# Patient Record
Sex: FEMALE | Race: WHITE | NOT HISPANIC OR LATINO | Employment: OTHER | URBAN - METROPOLITAN AREA
[De-identification: names, ages, dates, MRNs, and addresses within clinical notes are randomized per-mention and may not be internally consistent; named-entity substitution may affect disease eponyms.]

---

## 2017-04-25 ENCOUNTER — TELEPHONE (OUTPATIENT)
Dept: NEUROSURGERY | Facility: CLINIC | Age: 72
End: 2017-04-25

## 2017-04-25 DIAGNOSIS — M43.10 ACQUIRED SPONDYLOLISTHESIS: Primary | ICD-10-CM

## 2017-04-25 NOTE — TELEPHONE ENCOUNTER
Provider:  Asa  Caller: self  Time of call:   218  Phone #:  [phone]  Surgery:  Lumbar fusion, L3-5  Surgery Date:  11/2/16  Last visit:   12/6/16  Next visit: DEB MONTEZ:         Reason for call:         I have pended XR L-spine AP&Lat (because she is S/P Lumbar fusion)    ----- Message from Nkechi Pastrana sent at 4/25/2017  2:18 PM EDT -----  Contact: [phone]  PATIENT CALLING FOR A FOLLOW UP WITH DR. OSPINA.  ACCORDING TO LAST DICTATION SHE NEEDS TO HAVE DIAGNOSTIC STUDIES, NOT SURE WHAT SHE NEEDS.      PATIENT IS IN EPIC.

## 2017-04-27 ENCOUNTER — HOSPITAL ENCOUNTER (OUTPATIENT)
Dept: GENERAL RADIOLOGY | Facility: HOSPITAL | Age: 72
Discharge: HOME | End: 2017-04-27
Admitting: PHYSICIAN ASSISTANT

## 2017-04-27 DIAGNOSIS — M43.10 ACQUIRED SPONDYLOLISTHESIS: ICD-10-CM

## 2017-04-27 PROCEDURE — 72100 X-RAY EXAM L-S SPINE 2/3 VWS: CPT

## 2017-05-02 ENCOUNTER — OFFICE VISIT (OUTPATIENT)
Dept: NEUROSURGERY | Facility: CLINIC | Age: 72
End: 2017-05-02

## 2017-05-02 VITALS
DIASTOLIC BLOOD PRESSURE: 62 MMHG | HEIGHT: 67 IN | SYSTOLIC BLOOD PRESSURE: 130 MMHG | TEMPERATURE: 96.9 F | WEIGHT: 167 LBS | BODY MASS INDEX: 26.21 KG/M2

## 2017-05-02 DIAGNOSIS — M51.36 DEGENERATIVE DISC DISEASE, LUMBAR: ICD-10-CM

## 2017-05-02 DIAGNOSIS — M43.16 SPONDYLOLISTHESIS OF LUMBAR REGION: Primary | ICD-10-CM

## 2017-05-02 DIAGNOSIS — M48.062 SPINAL STENOSIS OF LUMBAR REGION WITH NEUROGENIC CLAUDICATION: ICD-10-CM

## 2017-05-02 PROCEDURE — 99212 OFFICE O/P EST SF 10 MIN: CPT | Performed by: NEUROLOGICAL SURGERY

## 2017-06-28 ENCOUNTER — OFFICE VISIT (OUTPATIENT)
Dept: CARDIOLOGY | Facility: CLINIC | Age: 72
End: 2017-06-28

## 2017-06-28 VITALS
BODY MASS INDEX: 27.19 KG/M2 | DIASTOLIC BLOOD PRESSURE: 72 MMHG | HEIGHT: 66 IN | HEART RATE: 64 BPM | WEIGHT: 169.2 LBS | SYSTOLIC BLOOD PRESSURE: 130 MMHG

## 2017-06-28 DIAGNOSIS — I49.8 ATRIAL ARRHYTHMIA: Primary | ICD-10-CM

## 2017-06-28 DIAGNOSIS — I10 ESSENTIAL HYPERTENSION: ICD-10-CM

## 2017-06-28 PROCEDURE — 99213 OFFICE O/P EST LOW 20 MIN: CPT | Performed by: INTERNAL MEDICINE

## 2017-06-28 RX ORDER — SODIUM PHOSPHATE,MONO-DIBASIC 19G-7G/118
1 ENEMA (ML) RECTAL DAILY
COMMUNITY
End: 2018-07-16 | Stop reason: HOSPADM

## 2017-06-28 RX ORDER — GLUCOSAMINE/D3/BOSWELLIA SERRA 1500MG-400
10000 TABLET ORAL DAILY
COMMUNITY

## 2017-06-28 NOTE — PROGRESS NOTES
Tanesha Lua  1945  [phone]      06/28/2017    [facility] CARDIOLOGY     Sreedhar Whyte MD  [address] [place] [address] DR CONTRERAS [address] [place] [id number]    Chief Complaint   Patient presents with   • Palpitations     PROBLEM LIST:  1. Multiple atrial arrhythmias:  a. 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   b. Persistent atrial bigeminy with paroxysms of atrial fibrillation.   c. Echocardiogram, 09/12/2007: Normal LV size and function; no significant valvular insufficiency.  d. Stress Cardiolite, 09/23/2010 normal LVEF, 75%. No ischemia.   2. Sinus bradycardia:  a. Resolved off of medical therapy.   3. Remote cigarette use with discontinuation approximately 10 years ago.  4. Lower back surgery in 1976 with left hip discomfort and numbness in the left anterior tibial area.  5. Hypertension.  6. Presyncope:  a. Tilt table test, 07/10/2010: Positive for vasodepressor effect on sublingual nitroglycerin.  b. Intolerant to midodrine.   7. Hyperlipidemia.   8. Chest discomfort:  a. Stress Cardiolite on 01/03/2003 with no evidence of ischemia.                  9. Back surgery 2017      Allergies  Allergies   Allergen Reactions   • Nasal Decongestant [Phenylephrine Hcl]      A-Fib       Current Medications    Current Outpatient Prescriptions:   •  aspirin 81 MG EC tablet, Take 81 mg by mouth daily., Disp: , Rfl:   •  Biotin 1000 MCG tablet, Take 1,000 mcg by mouth Daily., Disp: , Rfl:   •  calcium carbonate (OS-ELOINA) 600 MG tablet, Take 600 mg by mouth daily., Disp: , Rfl:   •  Ezetimibe (ZETIA PO), Take 10 mg by mouth daily., Disp: , Rfl:   •  fluticasone (FLONASE) 50 MCG/ACT nasal spray, 2 sprays into each nostril As Needed for rhinitis or allergies., Disp: , Rfl:   •  glucosamine-chondroitin 500-400 MG capsule capsule, Take 1 capsule by mouth Daily., Disp: , Rfl:   •  lisinopril (PRINIVIL,ZESTRIL) 40 MG tablet,  "Take 40 mg by mouth daily., Disp: , Rfl:   •  loratadine (CLARITIN) 10 MG tablet, Take 10 mg by mouth daily., Disp: , Rfl:   •  melatonin 1 MG tablet, Take 3 mg by mouth Every Night., Disp: , Rfl:   •  metoprolol succinate XL (TOPROL-XL) 25 MG 24 hr tablet, Take 25 mg by mouth daily., Disp: , Rfl:   •  Multiple Vitamins-Minerals (MULTIVITAMIN ADULT PO), Take 1 tablet by mouth Daily., Disp: , Rfl:   •  Omega-3 Fatty Acids (FISH OIL) 1000 MG capsule capsule, Take 1,000 mg by mouth Daily With Breakfast., Disp: , Rfl:   •  Polyethyl Glycol-Propyl Glycol (SYSTANE OP), Apply 1 drop to eye As Needed (dry eye)., Disp: , Rfl:     History of Present Illness   HPI    Pt presents for follow up of atrial arrhythmias and HTN. Since we last saw the pt, pt denies any sustained AF/flutter episodes. Every now and then, she feels a flutter. She denies SOB, CP, LH, and dizziness. Denies any bleeding, or TIA/CVA symptoms. Overall feels well. She had back surgery last fall and is doing much better. BP is well controlled.     ROS:  General:  Denies fatigue, weight gain or loss  Cardiovascular:  Denies CP, PND, syncope, near syncope, edema or palpitations.  Pulmonary:  Denies MANZO, cough, or wheezing      Vitals:    06/28/17 1007   BP: 130/72   BP Location: Left arm   Patient Position: Sitting   Pulse: 64   Weight: 169 lb 3.2 oz (76.7 kg)   Height: 66\" (167.6 cm)     PE:  General: NAD  Neck: no JVD, no carotid bruits, no TM  Heart RRR, NL S1, S2, S4 present, no rubs, murmurs  Lungs: CTA, no wheezes, rhonchi, or rales  Abd: soft, non-tender, NL BS  Ext: No musculoskeletal deformities, no edema, cyanosis, or clubbing  Psych: normal mood and affect    Diagnostic Data:  Procedures     Labs reveiwed from PCP   BUN 13   Cr 0.77  TSH 0.78  Hgb 12.7  Hct 39.7    1. Atrial arrhythmia    2. Essential hypertension          Plan:  1) Atrial arrhythmias/AT/PACs-  no recurrence  Continue present medications.   2) Anticoagulation  Continue ASA  3) HTN- " controlled  Wt loss, exercise, salt reduction    F/up in 12 months    Scribed for Hector Mcclellan MD by Vilma Desai PA-C. 6/28/2017  10:51 AM    I, Hector Mcclellan MD, personally performed the services face to face as described in this documentation and as scribed by the above named individual in my presence, and it is both accurate and complete.  6/28/2017  10:56 AM

## 2018-07-16 ENCOUNTER — OFFICE VISIT (OUTPATIENT)
Dept: CARDIOLOGY | Facility: CLINIC | Age: 73
End: 2018-07-16

## 2018-07-16 VITALS
WEIGHT: 165 LBS | BODY MASS INDEX: 26.52 KG/M2 | HEIGHT: 66 IN | SYSTOLIC BLOOD PRESSURE: 150 MMHG | HEART RATE: 56 BPM | OXYGEN SATURATION: 98 % | DIASTOLIC BLOOD PRESSURE: 82 MMHG

## 2018-07-16 DIAGNOSIS — I49.8 ATRIAL ARRHYTHMIA: Primary | ICD-10-CM

## 2018-07-16 DIAGNOSIS — I10 ESSENTIAL HYPERTENSION: ICD-10-CM

## 2018-07-16 DIAGNOSIS — R55 PRE-SYNCOPE: ICD-10-CM

## 2018-07-16 PROCEDURE — 99213 OFFICE O/P EST LOW 20 MIN: CPT | Performed by: PHYSICIAN ASSISTANT

## 2018-07-16 NOTE — PROGRESS NOTES
"    Tanesha Lua  1945  PCP: Sreedhar Whyte MD    SUBJECTIVE:   Tanesha Lua is a 73 y.o. female seen for a follow up visit regarding the following: Palpitations, HTN, bradycardia, history of AVNRT and PAF    CC:Palpitations    PROBLEM LIST:  1. Multiple atrial arrhythmias:  a. 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   b. Persistent atrial bigeminy with paroxysms of atrial fibrillation.   c. Echocardiogram, 09/12/2007: Normal LV size and function; no significant valvular insufficiency.  d. Stress Cardiolite, 09/23/2010 normal LVEF, 75%. No ischemia.   2. Sinus bradycardia:  a. Resolved off of medical therapy.   3. Remote cigarette use with discontinuation approximately 10 years ago.  4. Lower back surgery in 1976 with left hip discomfort and numbness in the left anterior tibial area.  5. Hypertension.  6. Presyncope:  a. Tilt table test, 07/10/2010: Positive for vasodepressor effect on sublingual nitroglycerin.  b. Intolerant to midodrine.   7. Hyperlipidemia.   8. Chest discomfort:  a. Stress Cardiolite on 01/03/2003 with no evidence of ischemia.                  9. Back surgery 2017     HPI:   Today I saw Mrs. Lua for routine follow-up regarding history of palpitations, AVNRT, bradycardia, and hypertension.  She states that since last visit with our office she has not had any recurrent episodes of palpitations or events with low heart rates.  She denies any recurrent near-syncope or syncope events.  She denies any chest pain or shortness of breath suggesting angina.  She does report that occasionally she will check her blood pressure when she is \"feeling bad\" and her blood pressure will sometimes be 130to 140 systolic range.  She does not routinely check her blood pressure otherwise.  She does follow-up with primary care provider and has lab work on a routine basis with most recent lab work that we have last " year showing mild dyslipidemia  on Zetia therapy.  Overall she states active she exercises regularly she recently bought a RV and is looking forward to traveling across country this summer.        ROS:  Review of Symptoms:  General: no recent weight loss/gain, weakness or fatigue  Skin: no rashes, lumps, or other skin changes  HEENT: + dizziness, lightheadedness, no vision changes  Respiratory: no cough or hemoptysis  Cardiovascular: + palpitations, and tachycardia  Gastrointestinal: no black/tarry stools or diarrhea  Urinary: no change in frequency or urgency  Peripheral Vascular: no claudication or leg cramps  Musculoskeletal: no muscle or joint pain/stiffness  Psychiatric: no depression or excessive stress  Neurological: no sensory or motor loss, no syncope  Hematologic: no anemia, easy bruising or bleeding  Endocrine: no thyroid problems, nor heat or cold intolerance    Cardiac PMH: (Old records have been reviewed and summarized below)      Past Medical History, Past Surgical History, Family history, Social History, and Medications were all reviewed with the patient today and updated as necessary.         Allergies   Allergen Reactions   • Nasal Decongestant [Phenylephrine Hcl]      A-Fib     Patient Active Problem List   Diagnosis   • Atrial arrhythmia   • Hypertension   • Hyperlipemia   • Pre-syncope   • Degenerative disc disease, lumbar   • Spondylisthesis   • Lumbar spine instability   • Spinal stenosis of lumbar region   • Spinal stenosis of lumbar region with neurogenic claudication     Past Medical History:   Diagnosis Date   • A-fib (CMS/[facility])    • Asthma     no problems in 5 years    • Back pain    • Back problem    • Bronchitis    • Cancer (CMS/HCC)     skin   • Chest discomfort     Stress Cardiolite on 01/03/2003 with no evidence of ischemia.   • Dizziness    • Heart burn    • Hx of fibrocystic disease of breast    • Hyperlipidemia    • Hypertension    • Sinus bradycardia     Resolved off of  "medical therapy.   • Stress incontinence    • Vertigo      Past Surgical History:   Procedure Laterality Date   • BACK SURGERY  1976    Lower back surgery-with left hip discomfort and numbness in the left anterior tibial area.   • BREAST LUMPECTOMY      bilateral   • CARDIAC ABLATION  01/01/1999   • COLONOSCOPY      15 years ago   • EYE SURGERY      bilateral cataracts removed   • KNEE CARTILAGE SURGERY Left 01/01/2010   • LASIK      bilateral   • LUMBAR LAMINECTOMY WITH FUSION N/A 11/2/2016    Procedure: LUMBAR DISCECTOMY FUSION INSTRUMENTATION WITH STEALTH L3-5;  Surgeon: Rene Bray MD;  Location: [facility];  Service:    • SKIN CANCER EXCISION       Family History   Problem Relation Age of Onset   • Cancer Mother    • Heart attack Father    • Stroke Sister    • Heart attack Brother      Social History   Substance Use Topics   • Smoking status: Former Smoker     Packs/day: 2.50     Types: Electronic Cigarette, Cigarettes     Quit date: 1990   • Smokeless tobacco: Never Used   • Alcohol use Yes      Comment: Rarely         PHYSICAL EXAM:    /82 (BP Location: Left arm, Patient Position: Sitting)   Pulse 56   Ht 167.6 cm (66\")   Wt 74.8 kg (165 lb)   SpO2 98%   BMI 26.63 kg/m²        Wt Readings from Last 5 Encounters:   07/16/18 74.8 kg (165 lb)   06/28/17 76.7 kg (169 lb 3.2 oz)   05/02/17 75.8 kg (167 lb)   12/06/16 75.6 kg (166 lb 9.6 oz)   11/02/16 75.8 kg (167 lb)       BP Readings from Last 5 Encounters:   07/16/18 150/82   06/28/17 130/72   05/02/17 130/62   12/06/16 126/72   11/05/16 136/64       General appearance - Alert, well appearing, and in no distress   Mental status - Affect appropriate to mood.  Eyes - Sclerae anicteric,  ENMT - Hearing grossly normal bilaterally, Dental hygiene good.  Neck - Carotids upstroke normal bilaterally, no bruits, no JVD.  Resp - Clear to auscultation, no wheezes, rales or rhonchi, symmetric air entry.  Heart - Normal rate, regular rhythm, normal S1, S2, " no murmurs, rubs, clicks or gallops.  GI - Soft, nontender, nondistended, no masses or organomegaly.  Neurological - Grossly intact - normal speech, no focal findings  Musculoskeletal - No joint tenderness, deformity or swelling, no muscular tenderness noted.  Extremities - Peripheral pulses normal, no pedal edema, no clubbing or cyanosis.  Skin - Normal coloration and turgor.  Psych -  oriented to person, place, and time.    Medical problems and test results were reviewed with the patient today.     ASSESSMENT and PLAN    1) Atrial arrhythmias/AT/PACs- She denies any  recurrence.    Continue present medications. Including BB.   2) Anticoagulation  Continue ASA daily   3) HTN- Borderline controlled. Home BP monitoring.  If remains elevated consider Zestoretic 40/12.5. Follow up with PCP.   4)HLD- Intolerance to statins. On Zetia. , followed by PCP  Wt loss, exercise, salt reduction    Return in about 1 year (around 7/16/2019).    7/16/2018  11:49 AM    Will Elsa HIGUERA

## 2019-07-22 NOTE — PROGRESS NOTES
[facility]   OFFICE NOTE      Tanesha Lua  1945  PCP: Sreedhar Whyte MD    SUBJECTIVE:   Tanesha Lua is a 74 y.o. female seen for a follow up visit regarding the following:     CC: Afib    HPI:   74-year-old white female presents today for evaluation regarding atrial arrhythmias, bradycardia, hypertension, and previous ablation procedures.  She reports that a few months ago she was with her family physician she had to adjust her lisinopril dose as her blood pressure has been elevated.  However with increasing does she has some episodes of dizziness and fatigue therefore the dose had to be corrected.  In addition she has had an episode where she went in the heat outside and few seconds felt nauseous weak lightheaded and had some funny feeling where her she may pass out with some vision changes.  She went inside drink some water laid on the couch and this resolved.  She fainted thinks is related to her blood pressure being on the low side.  She denies any significant tachypalpitations, chest pain with exertion suggesting angina or heart failure symptoms.    Cardiac PMH: (Old records have been reviewed and summarized below)  1. Multiple atrial arrhythmias:  a. 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   b. Persistent atrial bigeminy with paroxysms of atrial fibrillation.   c. Echocardiogram, 09/12/2007: Normal LV size and function; no significant valvular insufficiency.  d. Stress Cardiolite, 09/23/2010 normal LVEF, 75%. No ischemia.   e. Episode of Afib 2015 in setting of Pseduophed  2. Sinus bradycardia:  a. Resolved off of medical therapy.   3. Remote cigarette use with discontinuation approximately 10 years ago.  4. Hypertension.  5. Presyncope:  a. Tilt table test, 07/10/2010: Positive for vasodepressor effect on sublingual nitroglycerin.  b. Intolerant to midodrine.    6. Hyperlipidemia.   7. Chest discomfort:  a. Stress Cardiolite on 01/03/2003 with no evidence of ischemia.                  Past Medical History, Past Surgical History, Family history, Social History, and Medications were all reviewed with the patient today and updated as necessary.       Current Outpatient Medications:   •  aspirin 81 MG EC tablet, Take 81 mg by mouth daily., Disp: , Rfl:   •  Biotin 1000 MCG tablet, Take 1,000 mcg by mouth Daily., Disp: , Rfl:   •  calcium carbonate (OS-ELOINA) 600 MG tablet, Take 600 mg by mouth daily., Disp: , Rfl:   •  Ezetimibe (ZETIA PO), Take 10 mg by mouth daily., Disp: , Rfl:   •  fluticasone (FLONASE) 50 MCG/ACT nasal spray, 2 sprays into each nostril As Needed for rhinitis or allergies., Disp: , Rfl:   •  lisinopril (PRINIVIL,ZESTRIL) 40 MG tablet, Take 30 mg by mouth Daily., Disp: , Rfl:   •  loratadine (CLARITIN) 10 MG tablet, Take 10 mg by mouth As Needed., Disp: , Rfl:   •  melatonin 1 MG tablet, Take 3 mg by mouth Every Night., Disp: , Rfl:   •  metoprolol succinate XL (TOPROL-XL) 25 MG 24 hr tablet, Take 25 mg by mouth daily., Disp: , Rfl:   •  Multiple Vitamins-Minerals (MULTIVITAMIN ADULT PO), Take 1 tablet by mouth Daily., Disp: , Rfl:   •  Multiple Vitamins-Minerals (PRESERVISION AREDS PO), Take  by mouth Daily., Disp: , Rfl:   •  Omega-3 Fatty Acids (FISH OIL) 1000 MG capsule capsule, Take 1,000 mg by mouth Daily With Breakfast., Disp: , Rfl:   •  Polyethyl Glycol-Propyl Glycol (SYSTANE OP), Apply 1 drop to eye As Needed (dry eye)., Disp: , Rfl:   •  vitamin B-12 (CYANOCOBALAMIN) 1000 MCG tablet, Take 1,000 mcg by mouth Daily., Disp: , Rfl:       Allergies   Allergen Reactions   • Nasal Decongestant [Phenylephrine Hcl]      A-Fib     Patient Active Problem List   Diagnosis   • Atrial arrhythmia   • Hypertension   • Hyperlipemia   • Pre-syncope   • Degenerative disc disease, lumbar   • Spondylisthesis   • Lumbar spine instability   • Spinal stenosis of lumbar  region   • Spinal stenosis of lumbar region with neurogenic claudication     Past Medical History:   Diagnosis Date   • A-fib (CMS/HCC)    • Asthma     no problems in 5 years    • Back pain    • Back problem    • Bronchitis    • Cancer (CMS/HCC)     skin   • Chest discomfort     Stress Cardiolite on 2003 with no evidence of ischemia.   • Dizziness    • Heart burn    • Hx of fibrocystic disease of breast    • Hyperlipidemia    • Hypertension    • Sinus bradycardia     Resolved off of medical therapy.   • Stress incontinence    • Vertigo      Past Surgical History:   Procedure Laterality Date   • BACK SURGERY      Lower back surgery-with left hip discomfort and numbness in the left anterior tibial area.   • BREAST LUMPECTOMY      bilateral   • CARDIAC ABLATION  1999   • COLONOSCOPY      15 years ago   • EYE SURGERY      bilateral cataracts removed   • KNEE CARTILAGE SURGERY Left 2010   • LASIK      bilateral   • LUMBAR LAMINECTOMY WITH FUSION N/A 2016    Procedure: LUMBAR DISCECTOMY FUSION INSTRUMENTATION WITH STEALTH L3-5;  Surgeon: Rene Bray MD;  Location: [facility];  Service:    • SKIN CANCER EXCISION       Family History   Problem Relation Age of Onset   • Cancer Mother    • Heart attack Father    • Stroke Sister    • Heart attack Brother      Social History     Tobacco Use   • Smoking status: Former Smoker     Packs/day: 2.50     Types: Electronic Cigarette, Cigarettes     Last attempt to quit: 1990     Years since quittin.5   • Smokeless tobacco: Never Used   Substance Use Topics   • Alcohol use: Yes     Comment: Rarely       ROS:  Review of Symptoms:  General: no recent weight loss/gain, weakness or fatigue  Skin: no rashes, lumps, or other skin changes  HEENT: + dizziness.  Hx of Vertigo and NCS  Respiratory: no cough or hemoptysis  Cardiovascular: +palpitations, rare  Gastrointestinal: no black/tarry stools or diarrhea  Urinary: no change in frequency or  "urgency  Peripheral Vascular: no claudication or leg cramps  Musculoskeletal: no muscle or joint pain/stiffness  Psychiatric: no depression or excessive stress  Neurological: no sensory or motor loss,   Hematologic: no anemia, easy bruising or bleeding  Endocrine: no thyroid problems, nor heat or cold intolerance    PHYSICAL EXAM:    /66 (BP Location: Left arm, Patient Position: Sitting)   Pulse 61   Ht 167.6 cm (66\")   Wt 75.8 kg (167 lb 3.2 oz)   SpO2 98%   BMI 26.99 kg/m²        Wt Readings from Last 5 Encounters:   07/23/19 75.8 kg (167 lb 3.2 oz)   07/16/18 74.8 kg (165 lb)   06/28/17 76.7 kg (169 lb 3.2 oz)   05/02/17 75.8 kg (167 lb)   12/06/16 75.6 kg (166 lb 9.6 oz)       BP Readings from Last 5 Encounters:   07/23/19 118/66   07/16/18 150/82   06/28/17 130/72   05/02/17 130/62   12/06/16 126/72       General appearance - Alert, well appearing, and in no distress   Mental status - Affect appropriate to mood.  Eyes - Sclerae anicteric,  ENMT - Hearing grossly normal bilaterally, Dental hygiene good.  Neck - Carotids upstroke normal bilaterally, no bruits, no JVD.  Resp - Clear to auscultation, no wheezes, rales or rhonchi, symmetric air entry.  Heart - Normal rate, regular rhythm, normal S1, S2, no murmurs, rubs, clicks or gallops.  GI - Soft, nontender, nondistended, no masses or organomegaly.  Neurological - Grossly intact - normal speech, no focal findings  Musculoskeletal - No joint tenderness, deformity or swelling, no muscular tenderness noted.  Extremities - Peripheral pulses normal, no pedal edema, no clubbing or cyanosis.  Skin - Normal coloration and turgor.  Psych -  oriented to person, place, and time.    Medical problems and test results were reviewed with the patient today.     No results found for this or any previous visit (from the past 672 hour(s)).      EKG: (EKG has been independently visualized by me and summarized below)    ECG 12 Lead  Date/Time: 7/23/2019 11:39 " AM  Performed by: Rene Hunter PA  Authorized by: Rene Hunter PA   Comparison: compared with previous ECG from 11/1/2016  Similar to previous ECG  Rhythm: sinus rhythm  Ectopy: atrial premature contractions  Rate: normal  Conduction: conduction normal  ST Segments: ST segments normal  QRS axis: normal  Other: no other findings            ASSESSMENT   1. AT/PAC's/SVT:  Controlled on BB. She denies any palpitations.   2. HTN: Controlled on recently increased ACE, BB  3. HLD: Zetia   4. Anticoagulation:  ASA daily.   5. NCS:  Remote + TTT. Recent symptoms with change in BP medications, VVS when outside in heat.     PLAN  · Continue current medical therapy including beta-blocker with close monitoring of blood pressure and increase fluid intake.  She has had a few episodes of sound like recurrent vasovagal events in the heat.   · Return for follow-up in 1 year or sooner as needed.    7/23/2019  11:36 AM    Will Elsa HIGUERA

## 2019-07-23 ENCOUNTER — OFFICE VISIT (OUTPATIENT)
Dept: CARDIOLOGY | Facility: CLINIC | Age: 74
End: 2019-07-23

## 2019-07-23 VITALS
BODY MASS INDEX: 26.87 KG/M2 | DIASTOLIC BLOOD PRESSURE: 66 MMHG | HEART RATE: 61 BPM | SYSTOLIC BLOOD PRESSURE: 118 MMHG | WEIGHT: 167.2 LBS | OXYGEN SATURATION: 98 % | HEIGHT: 66 IN

## 2019-07-23 DIAGNOSIS — I49.8 ATRIAL ARRHYTHMIA: Primary | ICD-10-CM

## 2019-07-23 DIAGNOSIS — I10 ESSENTIAL HYPERTENSION: ICD-10-CM

## 2019-07-23 PROCEDURE — 99213 OFFICE O/P EST LOW 20 MIN: CPT | Performed by: PHYSICIAN ASSISTANT

## 2019-07-23 PROCEDURE — 93000 ELECTROCARDIOGRAM COMPLETE: CPT | Performed by: PHYSICIAN ASSISTANT

## 2019-07-23 RX ORDER — CYANOCOBALAMIN (VITAMIN B-12) 5000 MCG
5000 TABLET,DISINTEGRATING ORAL DAILY
COMMUNITY

## 2020-09-21 ENCOUNTER — OFFICE VISIT (OUTPATIENT)
Dept: CARDIOLOGY | Facility: CLINIC | Age: 75
End: 2020-09-21

## 2020-09-21 VITALS
HEIGHT: 68 IN | WEIGHT: 174 LBS | BODY MASS INDEX: 26.37 KG/M2 | HEART RATE: 75 BPM | DIASTOLIC BLOOD PRESSURE: 70 MMHG | OXYGEN SATURATION: 98 % | TEMPERATURE: 96.6 F | SYSTOLIC BLOOD PRESSURE: 120 MMHG

## 2020-09-21 DIAGNOSIS — I10 ESSENTIAL HYPERTENSION: Primary | ICD-10-CM

## 2020-09-21 DIAGNOSIS — R55 PRE-SYNCOPE: ICD-10-CM

## 2020-09-21 PROCEDURE — 99213 OFFICE O/P EST LOW 20 MIN: CPT | Performed by: PHYSICIAN ASSISTANT

## 2020-09-21 RX ORDER — UBIDECARENONE 100 MG
100 CAPSULE ORAL DAILY
COMMUNITY
End: 2021-05-12

## 2020-09-21 NOTE — PROGRESS NOTES
[facility] at [facility]   OFFICE NOTE      Tanesha Lua  1945  PCP: Sreedhar Whyte MD    SUBJECTIVE:   Tanesha Lua is a 75 y.o. female seen for a follow up visit regarding the following:     CC:HTN    HPI:   75 year old white female presents for follow up regarding SVT, bradycardia and Labile HTN. She reports her BP as dropped occasionally after her am dose of Zestril. Some times as low as 90mmHg systolic. She recently cute her dose of Zestril done to  20 mg daily. She denies any dizziness, near syncope or syncope. She denies any rapid heart beats or palpitations. She denies any chest pain or sob with exetion.     Cardiac PMH: (Old records have been reviewed and summarized below)  1. Multiple atrial arrhythmias:  a. 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   b. Persistent atrial bigeminy with paroxysms of atrial fibrillation.   c. Echocardiogram, 09/12/2007: Normal LV size and function; no significant valvular insufficiency.  d. Stress Cardiolite, 09/23/2010 normal LVEF, 75%. No ischemia.   e. Episode of Afib 2015 in setting of Pseduophed  2. Sinus bradycardia:  a. Resolved off of medical therapy.   3. Remote cigarette use with discontinuation approximately 10 years ago.  4. Hypertension.  5. Presyncope:  a. Tilt table test, 07/10/2010: Positive for vasodepressor effect on sublingual nitroglycerin.  b. Intolerant to midodrine.   6. Hyperlipidemia.   7. Chest discomfort:  a. Stress Cardiolite on 01/03/2003 with no evidence of ischemia  b. Negative MPS 2010.     Past Medical History, Past Surgical History, Family history, Social History, and Medications were all reviewed with the patient today and updated as necessary.       Current Outpatient Medications:   •  aspirin 81 MG EC tablet, Take 81 mg by mouth daily., Disp: , Rfl:   •  Biotin 1000 MCG tablet, Take 1,000 mcg by mouth Daily., Disp: ,  Rfl:   •  calcium carbonate (OS-ELOINA) 600 MG tablet, Take 600 mg by mouth daily., Disp: , Rfl:   •  coenzyme Q10 100 MG capsule, Take 100 mg by mouth Daily., Disp: , Rfl:   •  Ezetimibe (ZETIA PO), Take 10 mg by mouth daily., Disp: , Rfl:   •  fluticasone (FLONASE) 50 MCG/ACT nasal spray, 2 sprays into each nostril As Needed for rhinitis or allergies., Disp: , Rfl:   •  lisinopril (PRINIVIL,ZESTRIL) 40 MG tablet, Take 20 mg by mouth Daily., Disp: , Rfl:   •  loratadine (CLARITIN) 10 MG tablet, Take 10 mg by mouth As Needed., Disp: , Rfl:   •  melatonin 1 MG tablet, Take 3 mg by mouth Every Night., Disp: , Rfl:   •  metoprolol succinate XL (TOPROL-XL) 25 MG 24 hr tablet, Take 25 mg by mouth daily., Disp: , Rfl:   •  Multiple Vitamins-Minerals (MULTIVITAMIN ADULT PO), Take 1 tablet by mouth Daily., Disp: , Rfl:   •  Multiple Vitamins-Minerals (PRESERVISION AREDS PO), Take  by mouth Daily., Disp: , Rfl:   •  Omega-3 Fatty Acids (FISH OIL) 1000 MG capsule capsule, Take 1,000 mg by mouth Daily With Breakfast., Disp: , Rfl:   •  Polyethyl Glycol-Propyl Glycol (SYSTANE OP), Apply 1 drop to eye As Needed (dry eye)., Disp: , Rfl:   •  vitamin B-12 (CYANOCOBALAMIN) 1000 MCG tablet, Take 1,000 mcg by mouth Daily., Disp: , Rfl:       Allergies   Allergen Reactions   • Nasal Decongestant [Phenylephrine Hcl]      A-Fib     Patient Active Problem List   Diagnosis   • Atrial arrhythmia   • Hypertension   • Hyperlipemia   • Pre-syncope   • Degenerative disc disease, lumbar   • Spondylisthesis   • Lumbar spine instability   • Spinal stenosis of lumbar region   • Spinal stenosis of lumbar region with neurogenic claudication     Past Medical History:   Diagnosis Date   • A-fib (CMS/[facility])    • Asthma     no problems in 5 years    • Back pain    • Back problem    • Bronchitis    • Cancer (CMS/HCC)     skin   • Chest discomfort     Stress Cardiolite on 01/03/2003 with no evidence of ischemia.   • Dizziness    • Heart burn    • Hx of  fibrocystic disease of breast    • Hyperlipidemia    • Hypertension    • Sinus bradycardia     Resolved off of medical therapy.   • Stress incontinence    • Vertigo      Past Surgical History:   Procedure Laterality Date   • BACK SURGERY      Lower back surgery-with left hip discomfort and numbness in the left anterior tibial area.   • BREAST LUMPECTOMY      bilateral   • CARDIAC ABLATION  1999   • COLONOSCOPY      15 years ago   • EYE SURGERY      bilateral cataracts removed   • KNEE CARTILAGE SURGERY Left 2010   • LASIK      bilateral   • LUMBAR LAMINECTOMY WITH FUSION N/A 2016    Procedure: LUMBAR DISCECTOMY FUSION INSTRUMENTATION WITH STEALTH L3-5;  Surgeon: Rene Bray MD;  Location: [facility];  Service:    • SKIN CANCER EXCISION       Family History   Problem Relation Age of Onset   • Cancer Mother    • Heart attack Father    • Stroke Sister    • Heart attack Brother      Social History     Tobacco Use   • Smoking status: Former Smoker     Packs/day: 2.50     Types: Electronic Cigarette, Cigarettes     Quit date:      Years since quittin.7   • Smokeless tobacco: Never Used   Substance Use Topics   • Alcohol use: Yes     Comment: Rarely       ROS:  Review of Symptoms:  General: no recent weight loss/gain, weakness or fatigue  Skin: no rashes, lumps, or other skin changes  HEENT: no dizziness, lightheadedness, or vision changes  Respiratory: no cough or hemoptysis  Cardiovascular: + occasional  palpitations, and tachycardia  Gastrointestinal: no black/tarry stools or diarrhea  Urinary: no change in frequency or urgency  Peripheral Vascular: no claudication or leg cramps  Musculoskeletal: no muscle or joint pain/stiffness  Psychiatric: no depression or excessive stress  Neurological: no sensory or motor loss, no syncope  Hematologic: no anemia, easy bruising or bleeding  Endocrine: no thyroid problems, nor heat or cold intolerance    PHYSICAL EXAM:    /70 (BP Location:  "Right arm, Patient Position: Sitting)   Pulse 75   Temp 96.6 °F (35.9 °C)   Ht 172.7 cm (68\")   Wt 78.9 kg (174 lb)   SpO2 98%   BMI 26.46 kg/m²        Wt Readings from Last 5 Encounters:   09/21/20 78.9 kg (174 lb)   07/23/19 75.8 kg (167 lb 3.2 oz)   07/16/18 74.8 kg (165 lb)   06/28/17 76.7 kg (169 lb 3.2 oz)   05/02/17 75.8 kg (167 lb)       BP Readings from Last 5 Encounters:   09/21/20 120/70   07/23/19 118/66   07/16/18 150/82   06/28/17 130/72   05/02/17 130/62       General appearance - Alert, well appearing, and in no distress   Mental status - Affect appropriate to mood.  Eyes - Sclerae anicteric,  ENMT - Hearing grossly normal bilaterally, Dental hygiene good.  Neck - Carotids upstroke normal bilaterally, no bruits, no JVD.  Resp - Clear to auscultation, no wheezes, rales or rhonchi, symmetric air entry.  Heart - Normal rate, regular rhythm, normal S1, S2, no murmurs, rubs, clicks or gallops.  GI - Soft, nontender, nondistended, no masses or organomegaly.  Neurological - Grossly intact - normal speech, no focal findings  Musculoskeletal - No joint tenderness, deformity or swelling, no muscular tenderness noted.  Extremities - Peripheral pulses normal, no pedal edema, no clubbing or cyanosis.  Skin - Normal coloration and turgor.  Psych -  oriented to person, place, and time.    Medical problems and test results were reviewed with the patient today.     No results found for this or any previous visit (from the past 672 hour(s)).          ASSESSMENT   1. AT/PAC's/SVT: She denies any tachy palpitations.   2. HTN: Labile. Low BP in am after Zestril dose.   3. HLD: Zetia  4. NCS in setting of Heat outside    PLAN  · Change Zestril dose to 10mg BId. Hold evening dose if BP <110.   · Increase fluids, hydration.  · Follow up as scheduled 5/12/2021 9/21/2020  14:10 EDT    Will Elsa PA-C  "

## 2021-05-12 ENCOUNTER — OFFICE VISIT (OUTPATIENT)
Dept: CARDIOLOGY | Facility: CLINIC | Age: 76
End: 2021-05-12

## 2021-05-12 VITALS
WEIGHT: 164 LBS | SYSTOLIC BLOOD PRESSURE: 142 MMHG | OXYGEN SATURATION: 94 % | HEIGHT: 68 IN | BODY MASS INDEX: 24.86 KG/M2 | DIASTOLIC BLOOD PRESSURE: 84 MMHG | HEART RATE: 67 BPM

## 2021-05-12 DIAGNOSIS — I49.8 ATRIAL ARRHYTHMIA: Primary | ICD-10-CM

## 2021-05-12 DIAGNOSIS — I10 ESSENTIAL HYPERTENSION: ICD-10-CM

## 2021-05-12 PROCEDURE — 99214 OFFICE O/P EST MOD 30 MIN: CPT | Performed by: INTERNAL MEDICINE

## 2021-05-12 PROCEDURE — 93000 ELECTROCARDIOGRAM COMPLETE: CPT | Performed by: INTERNAL MEDICINE

## 2021-05-12 RX ORDER — MULTIVIT WITH MINERALS/LUTEIN
1000 TABLET ORAL DAILY
COMMUNITY
End: 2022-05-25

## 2021-05-12 RX ORDER — FAMOTIDINE 20 MG/1
20 TABLET, FILM COATED ORAL DAILY
COMMUNITY

## 2021-05-12 RX ORDER — LISINOPRIL AND HYDROCHLOROTHIAZIDE 20; 12.5 MG/1; MG/1
0.5 TABLET ORAL 2 TIMES DAILY
COMMUNITY
End: 2022-12-19 | Stop reason: ALTCHOICE

## 2021-05-12 NOTE — PROGRESS NOTES
Tanesha Lua  1945  [phone]    05/12/2021    [facility] CARDIOLOGY     Referring Provider: No ref. provider found     Isabell, Sreedhar Wolfe MD  [address] [place] [address] DR CONTRERAS [address] [place] [id number]    Chief Complaint   Patient presents with   • Essential hypertension       Problem List:     1. Multiple atrial arrhythmias:  a. 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   b. Persistent atrial bigeminy with paroxysms of atrial fibrillation.   c. Echocardiogram, 09/12/2007: Normal LV size and function; no significant valvular insufficiency.  d. Stress Cardiolite, 09/23/2010 normal LVEF, 75%. No ischemia.   e. Episode of Afib 2015 in setting of Pseduophed  2. Sinus bradycardia:  a. Resolved off of medical therapy.   3. Remote cigarette use with discontinuation approximately 10 years ago.  4. Hypertension.  5. Presyncope:  a. Tilt table test, 07/10/2010: Positive for vasodepressor effect on sublingual nitroglycerin.  b. Intolerant to midodrine.   6. Hyperlipidemia.   7. Chest discomfort:  a. Stress Cardiolite on 01/03/2003 with no evidence of ischemia  b. Negative MPS 2010.     Allergies  Allergies   Allergen Reactions   • Nasal Decongestant [Phenylephrine Hcl]      A-Fib       Current Medications    Current Outpatient Medications:   •  ascorbic acid (VITAMIN C) 1000 MG tablet, Take 1,000 mg by mouth Daily., Disp: , Rfl:   •  aspirin 81 MG EC tablet, Take 81 mg by mouth daily., Disp: , Rfl:   •  Biotin 60946 MCG tablet, Take 10,000 mcg by mouth Daily., Disp: , Rfl:   •  calcium carbonate (OS-ELOINA) 600 MG tablet, Take 600 mg by mouth daily., Disp: , Rfl:   •  Cyanocobalamin (Vitamin B-12) 5000 MCG tablet dispersible, Take 5,000 mcg by mouth Daily., Disp: , Rfl:   •  Ezetimibe (ZETIA PO), Take 10 mg by mouth daily., Disp: , Rfl:   •  famotidine (PEPCID) 20 MG tablet, Take 20 mg by mouth Daily., Disp: , Rfl:   •  fluticasone  "(FLONASE) 50 MCG/ACT nasal spray, 2 sprays into each nostril As Needed for rhinitis or allergies., Disp: , Rfl:   •  lisinopril-hydrochlorothiazide (PRINZIDE,ZESTORETIC) 20-12.5 MG per tablet, Take 0.5 tablets by mouth 2 (Two) Times a Day., Disp: , Rfl:   •  loratadine (CLARITIN) 10 MG tablet, Take 10 mg by mouth As Needed., Disp: , Rfl:   •  melatonin 5 MG tablet tablet, Take 5 mg by mouth Every Night., Disp: , Rfl:   •  metoprolol succinate XL (TOPROL-XL) 25 MG 24 hr tablet, Take 25 mg by mouth daily., Disp: , Rfl:   •  Multiple Vitamins-Minerals (MULTIVITAMIN ADULT PO), Take 1 tablet by mouth Daily., Disp: , Rfl:   •  Multiple Vitamins-Minerals (PRESERVISION AREDS PO), Take  by mouth Daily., Disp: , Rfl:   •  Omega-3 Fatty Acids (FISH OIL) 1000 MG capsule capsule, Take 1,000 mg by mouth Daily With Breakfast., Disp: , Rfl:   •  Polyethyl Glycol-Propyl Glycol (SYSTANE OP), Apply 1 drop to eye As Needed (dry eye)., Disp: , Rfl:     History of Present Illness     Pt presents for follow up of SVT, bradycardia, and labile HTN. Since we last saw the pt, pt denies any SVT episodes or palps. She does not to some intermittent chest heaviness and SOB, but this is not new and occurs at rest. Denies any hospitalizations, ER visits, bleeding issues on ASA, or TIA/CVA symptoms. Patient has continued to have some labile HTN at times. She tells me a couple times a month she will feel dizzy and \"lifeless\" and will check her bp and it will be 80s/50s. She will feel better after she lays down and rests. Otherwise her bps are 110-120/60-70s. Overall feels well.    ROS:  General:  + fatigue, No weight gain or loss  Cardiovascular:  Denies CP, PND, syncope, near syncope, edema or palpitations.  Pulmonary:  Denies MANZO, cough, or wheezing      Vitals:    05/12/21 1430   BP: 142/84   BP Location: Left arm   Patient Position: Sitting   Cuff Size: Adult   Pulse: 67   SpO2: 94%   Weight: 74.4 kg (164 lb)   Height: 172.7 cm (68\")     Body " mass index is 24.94 kg/m².  PE:  General: NAD  Neck: no JVD, no carotid bruits, no TM  Heart RRR, NL S1, S2, no rubs, murmurs  Lungs: CTA, no wheezes, rhonchi, or rales  Abd: soft, non-tender, NL BS  Ext: No musculoskeletal deformities, no edema, cyanosis, or clubbing  Psych: normal mood and affect    Diagnostic Data:        ECG 12 Lead    Date/Time: 5/12/2021 3:23 PM  Performed by: Hector Mcclellan MD  Authorized by: Hector Mcclellan MD   Comparison: compared with previous ECG from 7/23/2019  Similar to previous ECG  Rhythm: sinus rhythm  BPM: 60              1. Atrial arrhythmia    2. Essential hypertension          Plan:  1) Atrial arrhythmias/AT/PACs: no clinical recurrence  -  no recurrence  - Continue present medications.    2) Anticoagulation  -Continue ASA    3) HTN  -continues to be labile on Lisinopril-HCTC 20mg-12.5 mg 1/2 tab po bid, advised patient to to start taking just once a day 1/2 tab in AM and Toprol XL at nite. Keep log of bps..   Wt loss, exercise, salt reduction, increase fluids        Scribed for Hector Mcclellan MD by TIA Donnelly. 5/12/2021 15:23 EDT     I, Hector Mcclellan MD, personally performed the services described in this documentation as scribed by the above named individual in my presence, and it is both accurate and complete.  5/12/2021  15:24 EDT

## 2021-09-13 ENCOUNTER — TELEPHONE (OUTPATIENT)
Dept: CARDIOLOGY | Facility: CLINIC | Age: 76
End: 2021-09-13

## 2021-09-13 NOTE — TELEPHONE ENCOUNTER
Pt has had 3 episodes of afib in the last 2 weeks 2 of which she went to the ER. I transferred her to [facility] to schedule an appt with Rene Hunter PA-C.

## 2021-09-14 ENCOUNTER — OFFICE VISIT (OUTPATIENT)
Dept: CARDIOLOGY | Facility: CLINIC | Age: 76
End: 2021-09-14

## 2021-09-14 VITALS
WEIGHT: 160 LBS | SYSTOLIC BLOOD PRESSURE: 166 MMHG | BODY MASS INDEX: 24.25 KG/M2 | OXYGEN SATURATION: 95 % | DIASTOLIC BLOOD PRESSURE: 84 MMHG | HEART RATE: 65 BPM | HEIGHT: 68 IN

## 2021-09-14 DIAGNOSIS — I48.0 PAROXYSMAL ATRIAL FIBRILLATION (HCC): Primary | ICD-10-CM

## 2021-09-14 PROCEDURE — 93000 ELECTROCARDIOGRAM COMPLETE: CPT | Performed by: PHYSICIAN ASSISTANT

## 2021-09-14 PROCEDURE — 99214 OFFICE O/P EST MOD 30 MIN: CPT | Performed by: PHYSICIAN ASSISTANT

## 2021-09-14 RX ORDER — BRIMONIDINE TARTRATE/TIMOLOL 0.2%-0.5%
1 DROPS OPHTHALMIC (EYE) 2 TIMES DAILY
COMMUNITY
Start: 2021-07-19 | End: 2022-12-19

## 2021-09-14 NOTE — PROGRESS NOTES
Tanesha Lua  1945  [phone]    05/12/2021    [facility] CARDIOLOGY   Isabell, Sreedhar Wolfe MD  [address] [place] [address] DR CONTRERAS [address] [place] [id number]    Chief Complaint   Patient presents with   • Atrial arrhythmia       Problem List:     1. Multiple atrial arrhythmias:  a. 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   b. Persistent atrial bigeminy with paroxysms of atrial fibrillation.   c. Echocardiogram, 09/12/2007: Normal LV size and function; no significant valvular insufficiency.  d. Stress Cardiolite, 09/23/2010 normal LVEF, 75%. No ischemia.   e. Episode of Afib 2015 in setting of Pseduophed  f. Two recent ER visits to ED from [facility] in the past 2 weeks with A. fib with RVR per patient's report.  She was initiated on Xarelto therapy.  2. Sinus bradycardia:  a. Resolved off of medical therapy.   3. Remote cigarette use with discontinuation approximately 10 years ago.  4. Hypertension.  5. Presyncope:  a. Tilt table test, 07/10/2010: Positive for vasodepressor effect on sublingual nitroglycerin.  b. Intolerant to midodrine.   6. Hyperlipidemia.   7. Chest discomfort:  a. Stress Cardiolite on 01/03/2003 with no evidence of ischemia  b. Negative MPS 2010.     Allergies  Allergies   Allergen Reactions   • Nasal Decongestant [Phenylephrine Hcl]      A-Fib       Current Medications    Current Outpatient Medications:   •  ascorbic acid (VITAMIN C) 1000 MG tablet, Take 1,000 mg by mouth Daily., Disp: , Rfl:   •  aspirin 81 MG EC tablet, Take 81 mg by mouth daily., Disp: , Rfl:   •  Biotin 17243 MCG tablet, Take 10,000 mcg by mouth Daily., Disp: , Rfl:   •  calcium carbonate (OS-ELOINA) 600 MG tablet, Take 600 mg by mouth daily., Disp: , Rfl:   •  Cyanocobalamin (Vitamin B-12) 5000 MCG tablet dispersible, Take 5,000 mcg by mouth Daily., Disp: , Rfl:   •  Ezetimibe (ZETIA PO), Take 10 mg by mouth daily., Disp:  ", Rfl:   •  famotidine (PEPCID) 20 MG tablet, Take 20 mg by mouth Daily., Disp: , Rfl:   •  fluticasone (FLONASE) 50 MCG/ACT nasal spray, 2 sprays into each nostril As Needed for rhinitis or allergies., Disp: , Rfl:   •  lisinopril-hydrochlorothiazide (PRINZIDE,ZESTORETIC) 20-12.5 MG per tablet, Take 0.5 tablets by mouth 2 (Two) Times a Day., Disp: , Rfl:   •  loratadine (CLARITIN) 10 MG tablet, Take 10 mg by mouth As Needed., Disp: , Rfl:   •  melatonin 5 MG tablet tablet, Take 5 mg by mouth Every Night., Disp: , Rfl:   •  metoprolol succinate XL (TOPROL-XL) 25 MG 24 hr tablet, Take 25 mg by mouth daily., Disp: , Rfl:   •  Multiple Vitamins-Minerals (MULTIVITAMIN ADULT PO), Take 1 tablet by mouth Daily., Disp: , Rfl:   •  Multiple Vitamins-Minerals (PRESERVISION AREDS PO), Take  by mouth Daily., Disp: , Rfl:   •  Omega-3 Fatty Acids (FISH OIL) 1000 MG capsule capsule, Take 1,000 mg by mouth Daily With Breakfast., Disp: , Rfl:   •  Polyethyl Glycol-Propyl Glycol (SYSTANE OP), Apply 1 drop to eye As Needed (dry eye)., Disp: , Rfl:   •  Combigan 0.2-0.5 % ophthalmic solution, Administer 1 drop to both eyes 2 (two) times a day., Disp: , Rfl:     History of Present Illness   Pleasant 76-year-old female returns today for follow-up after recent recurrent breakthrough episodes of palpitations admission to ED from [facility] on 2 separate occasions.  Patient states these events occurred in the past month she has had 3 events to she led to her go to the ER.  She states in the ER she was told she had \"A. fib\".  We do not have records of these visits.  Apparently while there she was giving IV medications occluding IV metoprolol and other medications she eventually converted to sinus rhythm with only medications.  She is noted some edema in her lower extremities but otherwise she states she has not had any chest pain or chest tightness orthopnea other health changes since last seen with our office.  She was " "instructed to take Xarelto therapy after her last ER visit.  She has not started the medication yet.    ROS:  General:  + fatigue, No weight gain or loss  Cardiovascular:  Denies CP, PND, syncope, near syncope, edema or + palpitations  Pulmonary:  Denies MANZO, cough, or wheezing      Vitals:    09/14/21 1406   BP: 166/84   BP Location: Right arm   Patient Position: Sitting   Cuff Size: Adult   Pulse: 65   SpO2: 95%   Weight: 72.6 kg (160 lb)   Height: 172.7 cm (68\")     Body mass index is 24.33 kg/m².  PE:  General: NAD  Neck: no JVD, no carotid bruits, no TM  Heart RRR, NL S1, S2, no rubs, murmurs  Lungs: CTA, no wheezes, rhonchi, or rales  Abd: soft, non-tender, NL BS  Ext: No musculoskeletal deformities, no edema, cyanosis, or clubbing  Psych: normal mood and affect    Diagnostic Data:        ECG 12 Lead    Date/Time: 9/14/2021 2:57 PM  Performed by: Rene Hunter PA  Authorized by: Rene Hunter PA   Rhythm: sinus rhythm  Rate: normal  Conduction: conduction normal  ST Segments: ST segments normal  QRS axis: normal  Other: no other findings    Clinical impression: normal ECG            1. Paroxysmal atrial fibrillation (CMS/HCC)          Plan:  1) Atrial arrhythmias/AT/PACs/A. fib: Two separate ER visits to [facility] and another third breakthrough event.  She reports that this was atrial fibrillation/ flutter and spontaneusly converted to sinus rhythm after IV medications.  We will pursue an Echocardiogram tomorrow to rule out any structural heart disease. She has no angina symptoms . Will review EKg's  from [facility] .  We discussed risk and benefits of starting an AAD. She is agreeble to start Flecainide 50mg BID with follow up EKG 48 hours later if her Echo tomorrow is stable.     ) Anticoagulation: Initiate Xarelto 20 mg daily for chads Vasc equal to 3  Discontinue aspirin    3) HTN: Okay control on beta-blocker, Zestoretic  4)Remote negative MPS, and remote Normal " Echocardiogram .     Return for follow-up in 6 weeks or sooner as needed    Electronically signed by HAILEY Zuniga, 09/14/21, 3:01 PM EDT.

## 2021-09-15 ENCOUNTER — HOSPITAL ENCOUNTER (OUTPATIENT)
Dept: CARDIOLOGY | Facility: HOSPITAL | Age: 76
Discharge: HOME | End: 2021-09-15
Admitting: PHYSICIAN ASSISTANT

## 2021-09-15 VITALS — BODY MASS INDEX: 24.25 KG/M2 | HEIGHT: 68 IN | WEIGHT: 160 LBS

## 2021-09-15 DIAGNOSIS — I48.0 PAROXYSMAL ATRIAL FIBRILLATION (HCC): ICD-10-CM

## 2021-09-15 LAB
BH CV ECHO MEAS - AO MAX PG (FULL): 6.9 MMHG
BH CV ECHO MEAS - AO MAX PG: 11 MMHG
BH CV ECHO MEAS - AO MEAN PG (FULL): 4 MMHG
BH CV ECHO MEAS - AO MEAN PG: 6 MMHG
BH CV ECHO MEAS - AO ROOT AREA (BSA CORRECTED): 1.4
BH CV ECHO MEAS - AO ROOT AREA: 5.3 CM^2
BH CV ECHO MEAS - AO ROOT DIAM: 2.6 CM
BH CV ECHO MEAS - AO V2 MAX: 169 CM/SEC
BH CV ECHO MEAS - AO V2 MEAN: 116 CM/SEC
BH CV ECHO MEAS - AO V2 VTI: 38.8 CM
BH CV ECHO MEAS - AVA(I,A): 1.9 CM^2
BH CV ECHO MEAS - AVA(I,D): 1.9 CM^2
BH CV ECHO MEAS - AVA(V,A): 1.7 CM^2
BH CV ECHO MEAS - AVA(V,D): 1.7 CM^2
BH CV ECHO MEAS - BSA(HAYCOCK): 1.9 M^2
BH CV ECHO MEAS - BSA: 1.9 M^2
BH CV ECHO MEAS - BZI_BMI: 24.3 KILOGRAMS/M^2
BH CV ECHO MEAS - BZI_METRIC_HEIGHT: 172.7 CM
BH CV ECHO MEAS - BZI_METRIC_WEIGHT: 72.6 KG
BH CV ECHO MEAS - EDV(CUBED): 85.2 ML
BH CV ECHO MEAS - EDV(MOD-SP2): 49 ML
BH CV ECHO MEAS - EDV(MOD-SP4): 58 ML
BH CV ECHO MEAS - EDV(TEICH): 87.7 ML
BH CV ECHO MEAS - EF(CUBED): 85.2 %
BH CV ECHO MEAS - EF(MOD-BP): 55 %
BH CV ECHO MEAS - EF(MOD-SP2): 57.1 %
BH CV ECHO MEAS - EF(MOD-SP4): 56.9 %
BH CV ECHO MEAS - EF(TEICH): 78.7 %
BH CV ECHO MEAS - ESV(CUBED): 12.6 ML
BH CV ECHO MEAS - ESV(MOD-SP2): 21 ML
BH CV ECHO MEAS - ESV(MOD-SP4): 25 ML
BH CV ECHO MEAS - ESV(TEICH): 18.7 ML
BH CV ECHO MEAS - FS: 47 %
BH CV ECHO MEAS - IVS/LVPW: 0.88
BH CV ECHO MEAS - IVSD: 0.82 CM
BH CV ECHO MEAS - LA DIMENSION: 3.8 CM
BH CV ECHO MEAS - LA/AO: 1.5
BH CV ECHO MEAS - LAD MAJOR: 5 CM
BH CV ECHO MEAS - LAT PEAK E' VEL: 7.9 CM/SEC
BH CV ECHO MEAS - LATERAL E/E' RATIO: 11.9
BH CV ECHO MEAS - LV DIASTOLIC VOL/BSA (35-75): 31.2 ML/M^2
BH CV ECHO MEAS - LV IVRT: 0.13 SEC
BH CV ECHO MEAS - LV MASS(C)D: 122.9 GRAMS
BH CV ECHO MEAS - LV MASS(C)DI: 66.1 GRAMS/M^2
BH CV ECHO MEAS - LV MAX PG: 4.1 MMHG
BH CV ECHO MEAS - LV MEAN PG: 2 MMHG
BH CV ECHO MEAS - LV SYSTOLIC VOL/BSA (12-30): 13.4 ML/M^2
BH CV ECHO MEAS - LV V1 MAX: 101 CM/SEC
BH CV ECHO MEAS - LV V1 MEAN: 70.6 CM/SEC
BH CV ECHO MEAS - LV V1 VTI: 26.2 CM
BH CV ECHO MEAS - LVIDD: 4.4 CM
BH CV ECHO MEAS - LVIDS: 2.3 CM
BH CV ECHO MEAS - LVLD AP2: 7.1 CM
BH CV ECHO MEAS - LVLD AP4: 7 CM
BH CV ECHO MEAS - LVLS AP2: 5.8 CM
BH CV ECHO MEAS - LVLS AP4: 6.4 CM
BH CV ECHO MEAS - LVOT AREA (M): 2.8 CM^2
BH CV ECHO MEAS - LVOT AREA: 2.8 CM^2
BH CV ECHO MEAS - LVOT DIAM: 1.9 CM
BH CV ECHO MEAS - LVPWD: 0.93 CM
BH CV ECHO MEAS - MED PEAK E' VEL: 5 CM/SEC
BH CV ECHO MEAS - MEDIAL E/E' RATIO: 18.6
BH CV ECHO MEAS - MV A MAX VEL: 113 CM/SEC
BH CV ECHO MEAS - MV DEC TIME: 0.23 SEC
BH CV ECHO MEAS - MV E MAX VEL: 93.8 CM/SEC
BH CV ECHO MEAS - MV E/A: 0.83
BH CV ECHO MEAS - MV MAX PG: 6 MMHG
BH CV ECHO MEAS - MV MEAN PG: 2 MMHG
BH CV ECHO MEAS - MV V2 MAX: 122 CM/SEC
BH CV ECHO MEAS - MV V2 MEAN: 63.2 CM/SEC
BH CV ECHO MEAS - MV V2 VTI: 33.8 CM
BH CV ECHO MEAS - MVA(VTI): 2.2 CM^2
BH CV ECHO MEAS - PA ACC SLOPE: 368 CM/SEC^2
BH CV ECHO MEAS - PA ACC TIME: 0.18 SEC
BH CV ECHO MEAS - PA MAX PG: 3.1 MMHG
BH CV ECHO MEAS - PA PR(ACCEL): -3.4 MMHG
BH CV ECHO MEAS - PA V2 MAX: 88.7 CM/SEC
BH CV ECHO MEAS - RAP SYSTOLE: 3 MMHG
BH CV ECHO MEAS - RVSP: 30 MMHG
BH CV ECHO MEAS - SI(AO): 110.8 ML/M^2
BH CV ECHO MEAS - SI(CUBED): 39 ML/M^2
BH CV ECHO MEAS - SI(LVOT): 40 ML/M^2
BH CV ECHO MEAS - SI(MOD-SP2): 15.1 ML/M^2
BH CV ECHO MEAS - SI(MOD-SP4): 17.8 ML/M^2
BH CV ECHO MEAS - SI(TEICH): 37.1 ML/M^2
BH CV ECHO MEAS - SV(AO): 206 ML
BH CV ECHO MEAS - SV(CUBED): 72.5 ML
BH CV ECHO MEAS - SV(LVOT): 74.3 ML
BH CV ECHO MEAS - SV(MOD-SP2): 28 ML
BH CV ECHO MEAS - SV(MOD-SP4): 33 ML
BH CV ECHO MEAS - SV(TEICH): 69 ML
BH CV ECHO MEAS - TAPSE (>1.6): 2.1 CM
BH CV ECHO MEAS - TR MAX PG: 27 MMHG
BH CV ECHO MEAS - TR MAX VEL: 260.5 CM/SEC
BH CV ECHO MEASUREMENTS AVERAGE E/E' RATIO: 14.54
BH CV VAS BP LEFT ARM: NORMAL MMHG
BH CV XLRA - RV BASE: 3.3 CM
BH CV XLRA - RV LENGTH: 7.2 CM
BH CV XLRA - RV MID: 2.9 CM
BH CV XLRA - TDI S': 12.4 CM/SEC
IVRT: 197 MSEC
LEFT ATRIUM VOLUME INDEX: 26.4 ML/M^2
LEFT ATRIUM VOLUME: 49 ML

## 2021-09-15 PROCEDURE — 93306 TTE W/DOPPLER COMPLETE: CPT | Performed by: INTERNAL MEDICINE

## 2021-09-15 PROCEDURE — 93306 TTE W/DOPPLER COMPLETE: CPT

## 2021-09-16 ENCOUNTER — TELEPHONE (OUTPATIENT)
Dept: CARDIOLOGY | Facility: CLINIC | Age: 76
End: 2021-09-16

## 2021-09-16 ENCOUNTER — DOCUMENTATION (OUTPATIENT)
Dept: CARDIOLOGY | Facility: CLINIC | Age: 76
End: 2021-09-16

## 2021-09-16 DIAGNOSIS — I48.0 PAROXYSMAL ATRIAL FIBRILLATION (HCC): Primary | ICD-10-CM

## 2021-09-16 RX ORDER — FLECAINIDE ACETATE 50 MG/1
50 TABLET ORAL 2 TIMES DAILY
Qty: 180 TABLET | Refills: 3 | Status: SHIPPED | OUTPATIENT
Start: 2021-09-16

## 2021-09-16 NOTE — PROGRESS NOTES
Reviewed EKGs obtained from [facility] revealing PAF with RVR. She had a echocardiogram yesterday revealing normal LV function. She has normal stress test no evidence of ischemia in the past. We recommend initiating flecainide 50 mg every 12 hours with follow-up EKG in 48 hours. Return follow-up as scheduled. She will remain on anticoagulation as started per the [place] ER. Attempted to call patient there was no answer we'll standby.    intox

## 2021-09-16 NOTE — TELEPHONE ENCOUNTER
I spoke with the patient and gave her Will's instructions. I faxed the EKG order to Jeremy Jansen at [phone].

## 2021-11-04 ENCOUNTER — OFFICE VISIT (OUTPATIENT)
Dept: CARDIOLOGY | Facility: CLINIC | Age: 76
End: 2021-11-04

## 2021-11-04 VITALS
SYSTOLIC BLOOD PRESSURE: 118 MMHG | BODY MASS INDEX: 26.03 KG/M2 | HEIGHT: 66 IN | OXYGEN SATURATION: 98 % | DIASTOLIC BLOOD PRESSURE: 68 MMHG | WEIGHT: 162 LBS | HEART RATE: 60 BPM

## 2021-11-04 DIAGNOSIS — I49.8 ATRIAL ARRHYTHMIA: ICD-10-CM

## 2021-11-04 DIAGNOSIS — I48.0 PAROXYSMAL ATRIAL FIBRILLATION (HCC): Primary | ICD-10-CM

## 2021-11-04 PROCEDURE — 93000 ELECTROCARDIOGRAM COMPLETE: CPT | Performed by: PHYSICIAN ASSISTANT

## 2021-11-04 PROCEDURE — 99214 OFFICE O/P EST MOD 30 MIN: CPT | Performed by: PHYSICIAN ASSISTANT

## 2021-11-04 RX ORDER — LISINOPRIL 20 MG/1
TABLET ORAL
COMMUNITY
Start: 2021-10-25 | End: 2021-11-04

## 2021-11-04 RX ORDER — RIVAROXABAN 20 MG/1
20 TABLET, FILM COATED ORAL DAILY
COMMUNITY
Start: 2021-10-06

## 2021-11-04 NOTE — PROGRESS NOTES
Tanesha Lua  1945  [phone]    05/12/2021    [facility] CARDIOLOGY   Isabell, Sreedhar Wolfe MD  [address] [place] [address] DR CONTRERAS [address] [place] [id number]    Chief Complaint   Patient presents with   • PAF       Problem List:     1. Multiple atrial arrhythmias:  a. 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   b. Persistent atrial bigeminy with paroxysms of atrial fibrillation.   c. Echocardiogram, 09/12/2007: Normal LV size and function; no significant valvular insufficiency.  d. Stress Cardiolite, 09/23/2010 normal LVEF, 75%. No ischemia.   e. Episode of Afib 2015 in setting of Pseduophed  f. Two recent ER visits to ED from [facility] in the past 2 weeks with A. fib with RVR per patient's report.  She was initiated on Xarelto therapy.  2. Sinus bradycardia:  a. Resolved off of medical therapy.   3. Remote cigarette use with discontinuation approximately 10 years ago.  4. Hypertension.  5. Presyncope:  a. Tilt table test, 07/10/2010: Positive for vasodepressor effect on sublingual nitroglycerin.  b. Intolerant to midodrine.   6. Hyperlipidemia.   7. Chest discomfort:  a. Stress Cardiolite on 01/03/2003 with no evidence of ischemia  b. Negative MPS 2010.     Allergies  Allergies   Allergen Reactions   • Nasal Decongestant [Phenylephrine Hcl]      A-Fib       Current Medications    Current Outpatient Medications:   •  ascorbic acid (VITAMIN C) 1000 MG tablet, Take 1,000 mg by mouth Daily., Disp: , Rfl:   •  Biotin 16677 MCG tablet, Take 10,000 mcg by mouth Daily., Disp: , Rfl:   •  calcium carbonate (OS-ELOINA) 600 MG tablet, Take 600 mg by mouth daily., Disp: , Rfl:   •  Combigan 0.2-0.5 % ophthalmic solution, Administer 1 drop to both eyes 2 (two) times a day., Disp: , Rfl:   •  Cyanocobalamin (Vitamin B-12) 5000 MCG tablet dispersible, Take 5,000 mcg by mouth Daily., Disp: , Rfl:   •  Ezetimibe (ZETIA PO), Take 10  mg by mouth daily., Disp: , Rfl:   •  famotidine (PEPCID) 20 MG tablet, Take 20 mg by mouth Daily., Disp: , Rfl:   •  flecainide (TAMBOCOR) 50 MG tablet, Take 1 tablet by mouth 2 (Two) Times a Day., Disp: 180 tablet, Rfl: 3  •  fluticasone (FLONASE) 50 MCG/ACT nasal spray, 2 sprays into each nostril As Needed for rhinitis or allergies., Disp: , Rfl:   •  lisinopril-hydrochlorothiazide (PRINZIDE,ZESTORETIC) 20-12.5 MG per tablet, Take 0.5 tablets by mouth 2 (Two) Times a Day., Disp: , Rfl:   •  loratadine (CLARITIN) 10 MG tablet, Take 10 mg by mouth As Needed., Disp: , Rfl:   •  melatonin 5 MG tablet tablet, Take 5 mg by mouth Every Night., Disp: , Rfl:   •  metoprolol succinate XL (TOPROL-XL) 25 MG 24 hr tablet, Take 25 mg by mouth daily., Disp: , Rfl:   •  Multiple Vitamins-Minerals (MULTIVITAMIN ADULT PO), Take 1 tablet by mouth Daily., Disp: , Rfl:   •  Multiple Vitamins-Minerals (PRESERVISION AREDS PO), Take  by mouth Daily., Disp: , Rfl:   •  Omega-3 Fatty Acids (FISH OIL) 1000 MG capsule capsule, Take 1,000 mg by mouth Daily With Breakfast., Disp: , Rfl:   •  Polyethyl Glycol-Propyl Glycol (SYSTANE OP), Apply 1 drop to eye As Needed (dry eye)., Disp: , Rfl:   •  Xarelto 20 MG tablet, 20 mg Daily., Disp: , Rfl:     History of Present Illness   Pleasant 76-year-old female returns today for follow-up for SVT, atrial fibrillation and hypertension.  Since her last office visit she been started on flecainide therapy.  She states this drug is worked very well for her.  She has had very limited episodes of A. fib.  Her blood pressure well controlled.  She is tolerating current medical therapy well.  She has no bleeding issues on Eliquis.  Overall she feels medications helped a great deal.  ROS:  General:  + fatigue, No weight gain or loss  Cardiovascular:  Denies CP, PND, syncope, near syncope, edema or + palpitations  Pulmonary:  Denies MANZO, cough, or wheezing      Vitals:    11/04/21 1106   Weight: 73.5 kg (162 lb)  "  Height: 167.6 cm (66\")     Body mass index is 26.15 kg/m².  PE:  General: NAD  Neck: no JVD, no carotid bruits, no TM  Heart RRR, NL S1, S2, no rubs, murmurs  Lungs: CTA, no wheezes, rhonchi, or rales  Abd: soft, non-tender, NL BS  Ext: No musculoskeletal deformities, no edema, cyanosis, or clubbing  Psych: normal mood and affect    Diagnostic Data:        ECG 12 Lead    Date/Time: 11/4/2021 1:30 PM  Performed by: Rene Hunter PA  Authorized by: Rene Hunter PA   Rhythm: sinus rhythm  Rate: normal  Conduction: conduction normal  ST Segments: ST segments normal  T Waves: T waves normal  QRS axis: normal    Clinical impression: normal ECG            No diagnosis found.      Plan:  1) Atrial arrhythmias/AT/PACs/A. fib: Flecainide therapy, EKG today stable.  Symptoms well mitigated.  No recurrent breakthrough events.    2) Anticoagulation: Initiate Xarelto 20 mg daily for chads Vasc equal to 3      3) HTN: Okay control on beta-blocker, Zestoretic  4)Remote negative MPS, and remote Normal Echocardiogram .     Return for follow-up 6 months with Dr. Mcclellan    Electronically signed by HAILEY Zuniga, 11/04/21, 1:30 PM EDT.  "

## 2022-05-25 ENCOUNTER — OFFICE VISIT (OUTPATIENT)
Dept: CARDIOLOGY | Facility: CLINIC | Age: 77
End: 2022-05-25

## 2022-05-25 VITALS
BODY MASS INDEX: 25.39 KG/M2 | HEART RATE: 57 BPM | DIASTOLIC BLOOD PRESSURE: 70 MMHG | SYSTOLIC BLOOD PRESSURE: 128 MMHG | OXYGEN SATURATION: 98 % | HEIGHT: 66 IN | WEIGHT: 158 LBS

## 2022-05-25 DIAGNOSIS — I10 PRIMARY HYPERTENSION: ICD-10-CM

## 2022-05-25 DIAGNOSIS — I49.8 ATRIAL ARRHYTHMIA: Primary | ICD-10-CM

## 2022-05-25 PROCEDURE — 93000 ELECTROCARDIOGRAM COMPLETE: CPT | Performed by: INTERNAL MEDICINE

## 2022-05-25 PROCEDURE — 99213 OFFICE O/P EST LOW 20 MIN: CPT | Performed by: INTERNAL MEDICINE

## 2022-05-25 RX ORDER — TIMOLOL MALEATE 5 MG/ML
2 SOLUTION/ DROPS OPHTHALMIC DAILY
COMMUNITY
Start: 2022-04-07

## 2022-05-25 RX ORDER — BRIMONIDINE TARTRATE 2 MG/ML
2 SOLUTION/ DROPS OPHTHALMIC DAILY
COMMUNITY
Start: 2022-04-07

## 2022-05-25 NOTE — PROGRESS NOTES
Tanesha Lua  1945  [phone]    05/25/2022    [facility] CARDIOLOGY     Referring Provider: No ref. provider found     Isabell, Sreedhar Wolfe MD  [address] [place] [address] DR CONTRERAS [address] [place] [id number]    Chief Complaint   Patient presents with   • Atrial arrhythmia       Problem List:   1. Multiple atrial arrhythmias:  a. 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   b. Persistent atrial bigeminy with paroxysms of atrial fibrillation.   c. Echocardiogram, 09/12/2007: Normal LV size and function; no significant valvular insufficiency.  d. Stress Cardiolite, 09/23/2010 normal LVEF, 75%. No ischemia.   e. Episode of Afib 2015 in setting of Pseduophed  f. Two recent ER visits to ED from [facility] in the past 2 weeks with A. fib with RVR per patient's report.  She was initiated on Xarelto therapy.  g. Echo 9/15/2021 LVEF 55-60% no VHD  2. Sinus bradycardia:  a. Resolved off of medical therapy.   3. Remote cigarette use with discontinuation approximately 10 years ago.  4. Hypertension.  5. Presyncope:  a. Tilt table test, 07/10/2010: Positive for vasodepressor effect on sublingual nitroglycerin.  b. Intolerant to midodrine.   6. Hyperlipidemia.   7. Chest discomfort:  a. Stress Cardiolite on 01/03/2003 with no evidence of ischemia  b. Negative MPS 2010.     Allergies  Allergies   Allergen Reactions   • Nasal Decongestant [Phenylephrine Hcl]      A-Fib       Current Medications    Current Outpatient Medications:   •  Biotin 72993 MCG tablet, Take 10,000 mcg by mouth Daily., Disp: , Rfl:   •  brimonidine (ALPHAGAN) 0.2 % ophthalmic solution, , Disp: , Rfl:   •  calcium carbonate (OS-ELOINA) 600 MG tablet, Take 600 mg by mouth daily., Disp: , Rfl:   •  Combigan 0.2-0.5 % ophthalmic solution, Administer 1 drop to both eyes 2 (two) times a day., Disp: , Rfl:   •  Cyanocobalamin (Vitamin B-12) 5000 MCG tablet dispersible,  Take 5,000 mcg by mouth Daily., Disp: , Rfl:   •  Ezetimibe (ZETIA PO), Take 10 mg by mouth daily., Disp: , Rfl:   •  famotidine (PEPCID) 20 MG tablet, Take 20 mg by mouth Daily., Disp: , Rfl:   •  flecainide (TAMBOCOR) 50 MG tablet, Take 1 tablet by mouth 2 (Two) Times a Day., Disp: 180 tablet, Rfl: 3  •  fluticasone (FLONASE) 50 MCG/ACT nasal spray, 2 sprays into each nostril As Needed for rhinitis or allergies., Disp: , Rfl:   •  lisinopril-hydrochlorothiazide (PRINZIDE,ZESTORETIC) 20-12.5 MG per tablet, Take 0.5 tablets by mouth 2 (Two) Times a Day., Disp: , Rfl:   •  loratadine (CLARITIN) 10 MG tablet, Take 10 mg by mouth As Needed., Disp: , Rfl:   •  melatonin 5 MG tablet tablet, Take 5 mg by mouth Every Night., Disp: , Rfl:   •  metoprolol succinate XL (TOPROL-XL) 25 MG 24 hr tablet, Take 25 mg by mouth daily., Disp: , Rfl:   •  Multiple Vitamins-Minerals (MULTIVITAMIN ADULT PO), Take 1 tablet by mouth Daily., Disp: , Rfl:   •  Multiple Vitamins-Minerals (PRESERVISION AREDS PO), Take  by mouth Daily., Disp: , Rfl:   •  Omega-3 Fatty Acids (FISH OIL) 1000 MG capsule capsule, Take 1,000 mg by mouth Daily With Breakfast., Disp: , Rfl:   •  Polyethyl Glycol-Propyl Glycol (SYSTANE OP), Apply 1 drop to eye As Needed (dry eye)., Disp: , Rfl:   •  timolol (TIMOPTIC) 0.5 % ophthalmic solution, , Disp: , Rfl:   •  Xarelto 20 MG tablet, 20 mg Daily., Disp: , Rfl:     History of Present Illness     Pt presents for follow up of SVT/AF/HTN. Since we last saw the pt, pt denies any AF episodes, SOB, CP, LH, and dizziness. Denies any hospitalizations, ER visits, bleeding, or TIA/CVA symptoms. Overall feels well. Tom shave high and low BP at times.     ROS:  General:  Denies fatigue, weight gain or loss  Cardiovascular:  Denies CP, PND, syncope, near syncope, edema or palpitations.  Pulmonary:  Denies TOM, cough, or wheezing      Vitals:    05/25/22 1011   BP: 128/70   BP Location: Left arm   Patient Position: Sitting   Pulse:  "57   SpO2: 98%   Weight: 71.7 kg (158 lb)   Height: 167.6 cm (66\")     Body mass index is 25.5 kg/m².  PE:  General: NAD  Neck: no JVD, no carotid bruits, no TM  Heart RRR, NL S1, S2, S4 present, no rubs, murmurs  Lungs: CTA, no wheezes, rhonchi, or rales  Abd: soft, non-tender, NL BS  Ext: No musculoskeletal deformities, no edema, cyanosis, or clubbing  Psych: normal mood and affect    Diagnostic Data:        ECG 12 Lead    Date/Time: 5/25/2022 10:30 AM  Performed by: Hector Mcclellan MD  Authorized by: Hector Mcclellan MD   Comparison: compared with previous ECG from 11/4/2021  Similar to previous ECG  Rhythm: sinus rhythm  BPM: 58              1. Atrial arrhythmia    2. Primary hypertension          Plan:    1) Atrial arrhythmias/AT/PACs/A. fib: Flecainide therapy, EKG today stable.  Symptoms well mitigated.  No recurrent breakthrough events.      2) Anticoagulation: on Xarelto 20 mg daily for Chads Vasc equal to 3     3) HTN: Okay control on beta-blocker, Zestoretic           F/up in 8 months      "

## 2022-12-16 NOTE — PROGRESS NOTES
Tanesha Lua  1945  [phone]      [facility] CARDIOLOGY MAIN CAMPUS       Isabell, Sreedhar Wolfe MD  [address] [place] [address] DR CONTRERAS [address] [place] [id number]    Chief Complaint   Patient presents with   • PAF       Problem List:   1. Multiple atrial arrhythmias:  a. 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   b. Persistent atrial bigeminy with paroxysms of atrial fibrillation.   c. Echocardiogram, 09/12/2007: Normal LV size and function; no significant valvular insufficiency.  d. Stress Cardiolite, 09/23/2010 normal LVEF, 75%. No ischemia.   e. Episode of Afib 2015 in setting of Pseduophed  f. Two recent ER visits to ED from [facility] in the past 2 weeks with A. fib with RVR per patient's report.  She was initiated on Xarelto therapy.  g. Echo 9/15/2021 LVEF 55-60% no VHD  2. Sinus bradycardia:  a. Resolved off of medical therapy.   3. Remote cigarette use with discontinuation approximately 10 years ago.  4. Hypertension.  5. Presyncope:  a. Tilt table test, 07/10/2010: Positive for vasodepressor effect on sublingual nitroglycerin.  b. Intolerant to midodrine.   6. Hyperlipidemia.   7. Chest discomfort:  a. Stress Cardiolite on 01/03/2003 with no evidence of ischemia  b. Negative MPS 2010.     Allergies  Allergies   Allergen Reactions   • Nasal Decongestant [Phenylephrine Hcl]      A-Fib       Current Medications    Current Outpatient Medications:   •  Biotin 89723 MCG tablet, Take 10,000 mcg by mouth Daily., Disp: , Rfl:   •  brimonidine (ALPHAGAN) 0.2 % ophthalmic solution, Administer 2 drops to both eyes Daily., Disp: , Rfl:   •  calcium carbonate (OS-ELOINA) 600 MG tablet, Take 600 mg by mouth daily., Disp: , Rfl:   •  Cyanocobalamin (Vitamin B-12) 5000 MCG tablet dispersible, Take 5,000 mcg by mouth Daily., Disp: , Rfl:   •  Ezetimibe (ZETIA PO), Take 10 mg by mouth daily., Disp: , Rfl:   •  famotidine  (PEPCID) 20 MG tablet, Take 20 mg by mouth Daily., Disp: , Rfl:   •  flecainide (TAMBOCOR) 50 MG tablet, Take 1 tablet by mouth 2 (Two) Times a Day., Disp: 180 tablet, Rfl: 3  •  fluticasone (FLONASE) 50 MCG/ACT nasal spray, 2 sprays into each nostril As Needed for rhinitis or allergies., Disp: , Rfl:   •  hydroCHLOROthiazide (HYDRODIURIL) 12.5 MG tablet, As Needed., Disp: , Rfl:   •  lisinopril (PRINIVIL,ZESTRIL) 10 MG tablet, Take 1 tablet by mouth Daily., Disp: 30 tablet, Rfl: 6  •  loratadine (CLARITIN) 10 MG tablet, Take 10 mg by mouth As Needed., Disp: , Rfl:   •  melatonin 5 MG tablet tablet, Take 5 mg by mouth Every Night., Disp: , Rfl:   •  metoprolol succinate XL (TOPROL-XL) 25 MG 24 hr tablet, Take 25 mg by mouth daily., Disp: , Rfl:   •  Multiple Vitamins-Minerals (MULTIVITAMIN ADULT PO), Take 1 tablet by mouth Daily., Disp: , Rfl:   •  Multiple Vitamins-Minerals (PRESERVISION AREDS PO), Take  by mouth Daily., Disp: , Rfl:   •  Omega-3 Fatty Acids (FISH OIL) 1000 MG capsule capsule, Take 1,000 mg by mouth Daily With Breakfast., Disp: , Rfl:   •  Polyethyl Glycol-Propyl Glycol (SYSTANE OP), Apply 1 drop to eye As Needed (dry eye)., Disp: , Rfl:   •  timolol (TIMOPTIC) 0.5 % ophthalmic solution, Administer 2 drops to both eyes Daily., Disp: , Rfl:   •  Xarelto 20 MG tablet, 20 mg Daily., Disp: , Rfl:     History of Present Illness     Pt presents for follow up of SVT/AF/HTN. Since we last saw the pt, pt denies any AF episodes, SOB, CP, or syncope.   Denies any hospitalizations, ER visits, bleeding, or TIA/CVA symptoms.  She reports she is having labile blood pressure.  Sometimes is very low in the mornings and then rebounds in the afternoons higher.  Otherwise she has no complaints of arrhythmias and feels the flecainide is working quite well.  She has had no bleeding issues on the Xarelto.  She states she is going to [place] in a few weeks.  Her  has prostate cancer and he is  undergoing treatment  "while in [place].    Vitals:    12/19/22 1029   BP: 118/78   BP Location: Right arm   Patient Position: Sitting   Pulse: 63   SpO2: 97%   Weight: 70.3 kg (155 lb)   Height: 167.6 cm (66\")     Body mass index is 25.02 kg/m².  PE:  General: NAD  Neck: no JVD, no carotid bruits, no TM  Heart RRR, NL S1, S2, S4 present, no rubs, murmurs  Lungs: CTA, no wheezes, rhonchi, or rales  Abd: soft, non-tender, NL BS  Ext: No musculoskeletal deformities, no edema, cyanosis, or clubbing  Psych: normal mood and affect    Diagnostic Data:        ECG 12 Lead    Date/Time: 12/19/2022 12:51 PM  Performed by: Rene Hunter PA  Authorized by: Rene Hunter PA   Comparison: compared with previous ECG from 5/25/2022  Similar to previous ECG  Rhythm: sinus rhythm  Rate: normal  Conduction: conduction normal  T Waves: T waves normal  QRS axis: normal  Other: no other findings    Clinical impression: normal ECG            1. Pre-syncope    2. Atrial arrhythmia    3. Primary hypertension          Plan:    1) Atrial arrhythmias/AT/PACs/A. fib: Flecainide therapy, EKG today stable.  Symptoms well mitigated.  No recurrent breakthrough events.      2) Anticoagulation: on Xarelto 20 mg daily for Chads Vasc equal to 3     3) HTN: Labile.    We will attempt to simplify her drug regiment by discontinuing Zestoretic.  She will take lisinopril 10 mg every 12 hours..  Continue monitor blood pressure closely at home.  She will take diuretic as needed.  Increase exercise.  She is going to [place] and plans on doing some of this.           F/up in 8 months with Dr. Mcclellan.    Electronically signed by HAILEY Zuniga, 12/19/22, 12:53 PM EST.  "

## 2022-12-19 ENCOUNTER — OFFICE VISIT (OUTPATIENT)
Dept: CARDIOLOGY | Facility: CLINIC | Age: 77
End: 2022-12-19

## 2022-12-19 VITALS
HEART RATE: 63 BPM | BODY MASS INDEX: 24.91 KG/M2 | SYSTOLIC BLOOD PRESSURE: 118 MMHG | HEIGHT: 66 IN | OXYGEN SATURATION: 97 % | WEIGHT: 155 LBS | DIASTOLIC BLOOD PRESSURE: 78 MMHG

## 2022-12-19 DIAGNOSIS — I10 PRIMARY HYPERTENSION: ICD-10-CM

## 2022-12-19 DIAGNOSIS — I49.8 ATRIAL ARRHYTHMIA: ICD-10-CM

## 2022-12-19 DIAGNOSIS — R55 PRE-SYNCOPE: Primary | ICD-10-CM

## 2022-12-19 PROCEDURE — 99214 OFFICE O/P EST MOD 30 MIN: CPT | Performed by: PHYSICIAN ASSISTANT

## 2022-12-19 PROCEDURE — 93000 ELECTROCARDIOGRAM COMPLETE: CPT | Performed by: PHYSICIAN ASSISTANT

## 2022-12-19 RX ORDER — LISINOPRIL 10 MG/1
10 TABLET ORAL DAILY
Qty: 30 TABLET | Refills: 6 | Status: SHIPPED | OUTPATIENT
Start: 2022-12-19 | End: 2022-12-30 | Stop reason: SDUPTHER

## 2022-12-19 RX ORDER — LISINOPRIL 10 MG/1
10 TABLET ORAL DAILY
Qty: 30 TABLET | Refills: 6 | Status: SHIPPED | OUTPATIENT
Start: 2022-12-19 | End: 2022-12-19 | Stop reason: SDUPTHER

## 2022-12-19 RX ORDER — HYDROCHLOROTHIAZIDE 12.5 MG/1
TABLET ORAL AS NEEDED
COMMUNITY
Start: 2022-10-07

## 2022-12-29 ENCOUNTER — TELEPHONE (OUTPATIENT)
Dept: CARDIOLOGY | Facility: CLINIC | Age: 77
End: 2022-12-29

## 2022-12-29 NOTE — TELEPHONE ENCOUNTER
Patient called and would like clarification her Lisinopril order. Is she supposed to take Lisinopril 10mg BID or q day?

## 2022-12-30 RX ORDER — LISINOPRIL 10 MG/1
10 TABLET ORAL 2 TIMES DAILY
Qty: 30 TABLET | Refills: 6 | Status: SHIPPED | OUTPATIENT
Start: 2022-12-30 | End: 2023-01-09 | Stop reason: SDUPTHER

## 2022-12-30 NOTE — TELEPHONE ENCOUNTER
Per my previous office note lisinopril 10 mg p.o. every 12 hours.  I reordered this in the pharmacy to reflect this.

## 2023-01-09 RX ORDER — LISINOPRIL 10 MG/1
10 TABLET ORAL 2 TIMES DAILY
Qty: 180 TABLET | Refills: 3 | Status: SHIPPED | OUTPATIENT
Start: 2023-01-09

## 2023-08-23 ENCOUNTER — OFFICE VISIT (OUTPATIENT)
Dept: CARDIOLOGY | Facility: CLINIC | Age: 78
End: 2023-08-23
Payer: MEDICARE

## 2023-08-23 VITALS
OXYGEN SATURATION: 97 % | BODY MASS INDEX: 24.91 KG/M2 | HEART RATE: 59 BPM | WEIGHT: 155 LBS | DIASTOLIC BLOOD PRESSURE: 62 MMHG | HEIGHT: 66 IN | SYSTOLIC BLOOD PRESSURE: 124 MMHG

## 2023-08-23 DIAGNOSIS — I10 PRIMARY HYPERTENSION: ICD-10-CM

## 2023-08-23 DIAGNOSIS — I49.8 ATRIAL ARRHYTHMIA: Primary | ICD-10-CM

## 2023-08-23 PROCEDURE — 93000 ELECTROCARDIOGRAM COMPLETE: CPT | Performed by: PHYSICIAN ASSISTANT

## 2023-08-23 PROCEDURE — 3078F DIAST BP <80 MM HG: CPT | Performed by: PHYSICIAN ASSISTANT

## 2023-08-23 PROCEDURE — 99214 OFFICE O/P EST MOD 30 MIN: CPT | Performed by: PHYSICIAN ASSISTANT

## 2023-08-23 PROCEDURE — 3074F SYST BP LT 130 MM HG: CPT | Performed by: PHYSICIAN ASSISTANT

## 2023-08-23 NOTE — PROGRESS NOTES
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   Persistent atrial bigeminy with paroxysms of atrial fibrillation.   Echocardiogram, 09/12/2007: Normal LV size and function; no significant valvular insufficiency.  Stress Cardiolite, 09/23/2010 normal LVEF, 75%. No ischemia.   Episode of Afib 2015 in setting of Pseduophed  Two recent ER visits to ED from [facility] in the past 2 weeks with A. fib with RVR per patient's report.  She was initiated on Xarelto therapy.  Echo 9/15/2021 LVEF 55-60% no VHD  Sinus bradycardia:  Resolved off of medical therapy.   Remote cigarette use with discontinuation approximately 10 years ago.  Hypertension.  Presyncope:  Tilt table test, 07/10/2010: Positive for vasodepressor effect on sublingual nitroglycerin.  Intolerant to midodrine.   Hyperlipidemia.   Chest discomfort:  Stress Cardiolite on 01/03/2003 with no evidence of ischemia  Negative MPS 2010.     Allergies  Allergies   Allergen Reactions    Nasal Decongestant [Phenylephrine Hcl (Pressors)]      A-Fib       Current Medications    Current Outpatient Medications:     Biotin 36074 MCG tablet, Take 1 tablet by mouth Daily., Disp: , Rfl:     brimonidine (ALPHAGAN) 0.2 % ophthalmic solution, Administer 2 drops to both eyes Daily., Disp: , Rfl:     calcium carbonate (OS-ELOINA) 600 MG tablet, Take 1 tablet by mouth Daily., Disp: , Rfl:     Cyanocobalamin (Vitamin B-12) 5000 MCG tablet dispersible, Take 1 tablet by mouth Daily., Disp: , Rfl:     Ezetimibe (ZETIA PO), Take 10 mg by mouth daily., Disp: , Rfl:     famotidine (PEPCID) 20 MG tablet, Take 1 tablet by  mouth Daily., Disp: , Rfl:     flecainide (TAMBOCOR) 50 MG tablet, Take 1 tablet by mouth 2 (Two) Times a Day., Disp: 180 tablet, Rfl: 3    fluticasone (FLONASE) 50 MCG/ACT nasal spray, 2 sprays into the nostril(s) as directed by provider As Needed for Rhinitis or Allergies., Disp: , Rfl:     hydroCHLOROthiazide (HYDRODIURIL) 12.5 MG tablet, As Needed., Disp: , Rfl:     lisinopril (PRINIVIL,ZESTRIL) 10 MG tablet, Take 1 tablet by mouth 2 (Two) Times a Day., Disp: 180 tablet, Rfl: 3    loratadine (CLARITIN) 10 MG tablet, Take 1 tablet by mouth As Needed., Disp: , Rfl:     melatonin 5 MG tablet tablet, Take 1 tablet by mouth Every Night., Disp: , Rfl:     metoprolol succinate XL (TOPROL-XL) 25 MG 24 hr tablet, Take 1 tablet by mouth Daily., Disp: , Rfl:     Multiple Vitamins-Minerals (MULTIVITAMIN ADULT PO), Take 1 tablet by mouth Daily., Disp: , Rfl:     Multiple Vitamins-Minerals (PRESERVISION AREDS PO), Take  by mouth Daily., Disp: , Rfl:     Omega-3 Fatty Acids (FISH OIL) 1000 MG capsule capsule, Take 1 capsule by mouth Daily With Breakfast., Disp: , Rfl:     Polyethyl Glycol-Propyl Glycol (SYSTANE OP), Apply 1 drop to eye As Needed (dry eye)., Disp: , Rfl:     timolol (TIMOPTIC) 0.5 % ophthalmic solution, Administer 2 drops to both eyes Daily., Disp: , Rfl:     Xarelto 20 MG tablet, 1 tablet Daily., Disp: , Rfl:     History of Present Illness     Pt presents for follow up of SVT/AF/HTN. Since we last saw the pt, pt denies any AF episodes, SOB, CP, or syncope.   Denies any hospitalizations, ER visits, bleeding, or TIA/CVA symptoms.  Her blood pressure has been more stable.  She tells me she is took a trip to [place] and through [place] with hiking and trails and she did well with this.  She is now moving in a new house.  Overall she feels she is doing quite well symptom-wise..    Vitals:    08/23/23 1011   BP: 124/62   BP Location: Right arm   Patient Position: Sitting   Pulse: 59   SpO2: 97%   Weight:  "70.3 kg (155 lb)   Height: 167.6 cm (66\")     Body mass index is 25.02 kg/mý.  PE:  General: NAD  Neck: no JVD, no carotid bruits, no TM  Heart RRR, NL S1, S2, S4 present, no rubs, murmurs  Lungs: CTA, no wheezes, rhonchi, or rales  Abd: soft, non-tender, NL BS  Ext: No musculoskeletal deformities, no edema, cyanosis, or clubbing  Psych: normal mood and affect    Diagnostic Data:        ECG 12 Lead    Date/Time: 8/23/2023 1:02 PM  Performed by: Rene Hunter PA  Authorized by: Rene Hunter PA   Comparison: compared with previous ECG from 12/19/2022  Similar to previous ECG  Rhythm: sinus bradycardia  Rate: normal  Conduction: conduction normal  ST Segments: ST segments normal  T Waves: T waves normal  QRS axis: normal    Clinical impression: normal ECG        1. Atrial arrhythmia    2. Primary hypertension          Plan:    1) Atrial arrhythmias/AT/PACs/A. fib: Flecainide therapy, EKG today stable.  Symptoms well mitigated.  No recurrent breakthrough events.      2) Anticoagulation: on Xarelto 20 mg daily for Chads Vasc equal to 3     3) HTN: Improved control         F/up in 8 months with Dr. Mcclellan.  Electronically signed by HAILEY Zuniga, 08/23/23, 1:03 PM EDT.   "

## 2024-01-11 RX ORDER — LISINOPRIL 10 MG/1
10 TABLET ORAL 2 TIMES DAILY
Qty: 180 TABLET | Refills: 3 | Status: SHIPPED | OUTPATIENT
Start: 2024-01-11

## 2024-04-02 RX ORDER — METOPROLOL SUCCINATE 25 MG/1
25 TABLET, EXTENDED RELEASE ORAL DAILY
Qty: 90 TABLET | Refills: 1 | Status: SHIPPED | OUTPATIENT
Start: 2024-04-02

## 2024-04-02 NOTE — TELEPHONE ENCOUNTER
Caller: Tanesha Lua    Relationship: Self    Best call back number: [phone]    Requested Prescriptions:   Requested Prescriptions     Pending Prescriptions Disp Refills    metoprolol succinate XL (TOPROL-XL) 25 MG 24 hr tablet       Sig: Take 1 tablet by mouth Daily.        Pharmacy where request should be sent: [facility] [address] [phone] [facility] [phone]      Last office visit with prescribing clinician: 5/25/2022   Last telemedicine visit with prescribing clinician: Visit date not found   Next office visit with prescribing clinician: 9/11/2024     Additional details provided by patient:     Does the patient have less than a 3 day supply:  [] Yes  [x] No    Would you like a call back once the refill request has been completed: [] Yes [x] No    If the office needs to give you a call back, can they leave a voicemail: [] Yes [x] No    Wenceslao Munoz Rep   04/02/24 14:24 EDT

## 2024-05-21 ENCOUNTER — TELEPHONE (OUTPATIENT)
Dept: CARDIOLOGY | Facility: CLINIC | Age: 79
End: 2024-05-21
Payer: MEDICARE

## 2024-05-23 ENCOUNTER — TELEPHONE (OUTPATIENT)
Dept: CARDIOLOGY | Facility: CLINIC | Age: 79
End: 2024-05-23
Payer: MEDICARE

## 2024-05-23 NOTE — TELEPHONE ENCOUNTER
Patient called and left a message. I tried to call her back but she did not answer. I left a message for her to call me back at the office.

## 2024-05-24 RX ORDER — RIVAROXABAN 20 MG/1
20 TABLET, FILM COATED ORAL
Qty: 90 TABLET | Refills: 2 | Status: SHIPPED | OUTPATIENT
Start: 2024-05-24

## 2024-10-02 ENCOUNTER — OFFICE VISIT (OUTPATIENT)
Dept: CARDIOLOGY | Facility: CLINIC | Age: 79
End: 2024-10-02
Payer: MEDICARE

## 2024-10-02 VITALS
HEART RATE: 60 BPM | BODY MASS INDEX: 25.3 KG/M2 | SYSTOLIC BLOOD PRESSURE: 168 MMHG | HEIGHT: 66 IN | DIASTOLIC BLOOD PRESSURE: 72 MMHG | WEIGHT: 157.4 LBS | OXYGEN SATURATION: 99 %

## 2024-10-02 DIAGNOSIS — I10 PRIMARY HYPERTENSION: ICD-10-CM

## 2024-10-02 DIAGNOSIS — I49.8 ATRIAL ARRHYTHMIA: Primary | ICD-10-CM

## 2024-10-02 DIAGNOSIS — I47.19 ATRIAL TACHYCARDIA: ICD-10-CM

## 2024-10-02 PROCEDURE — 3077F SYST BP >= 140 MM HG: CPT | Performed by: INTERNAL MEDICINE

## 2024-10-02 PROCEDURE — 3078F DIAST BP <80 MM HG: CPT | Performed by: INTERNAL MEDICINE

## 2024-10-02 PROCEDURE — 93000 ELECTROCARDIOGRAM COMPLETE: CPT | Performed by: INTERNAL MEDICINE

## 2024-10-02 PROCEDURE — 99214 OFFICE O/P EST MOD 30 MIN: CPT | Performed by: INTERNAL MEDICINE

## 2024-10-02 RX ORDER — DORZOLAMIDE HYDROCHLORIDE AND TIMOLOL MALEATE 20; 5 MG/ML; MG/ML
1 SOLUTION/ DROPS OPHTHALMIC 2 TIMES DAILY
COMMUNITY
Start: 2024-08-01

## 2024-10-02 RX ORDER — DIPHENHYDRAMINE HCL 25 MG
12.5 CAPSULE ORAL NIGHTLY PRN
COMMUNITY

## 2024-10-02 RX ORDER — LATANOPROST 50 UG/ML
1 SOLUTION/ DROPS OPHTHALMIC DAILY
COMMUNITY
Start: 2024-09-20

## 2024-10-02 NOTE — LETTER
October 2, 2024       No Recipients    Patient: Tanesha Lua   YOB: 1945   Date of Visit: 10/2/2024       Dear   No Recipients,    Tanesha Lua was in my office today. Below are the relevant portions of my assessment and plan of care.           If you have questions, please do not hesitate to call me. I look forward to following Tanesha along with you.         Sincerely,        Hector Mcclellan MD        CC:   No Recipients

## 2024-10-02 NOTE — PROGRESS NOTES
Tanesha Lua  1945  [phone]    10/02/2024    [facility] CARDIOLOGY     Referring Provider: No ref. 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   Persistent atrial bigeminy with paroxysms of atrial fibrillation.   Echocardiogram, 09/12/2007: Normal LV size and function; no significant valvular insufficiency.  Stress Cardiolite, 09/23/2010 normal LVEF, 75%. No ischemia.   Episode of Afib 2015 in setting of Pseduophed  Two recent ER visits to ED from [facility] in the past 2 weeks with BROCK ulloa with RVR per patient's report.  She was initiated on Xarelto therapy.  Echo 9/15/2021 LVEF 55-60% no VHD  Sinus bradycardia:  Resolved off of medical therapy.   Remote cigarette use with discontinuation approximately 10 years ago.  Hypertension.  Presyncope:  Tilt table test, 07/10/2010: Positive for vasodepressor effect on sublingual nitroglycerin.  Intolerant to midodrine.   Hyperlipidemia.   Chest discomfort:  Stress Cardiolite on 01/03/2003 with no evidence of ischemia  Negative MPS 2010.   Allergies  Allergies   Allergen Reactions    Nasal Decongestant [Phenylephrine Hcl (Pressors)]      A-Fib       Current Medications    Current Outpatient Medications:     Biotin 57557 MCG tablet, Take 1 tablet by mouth Daily., Disp: , Rfl:     calcium carbonate (OS-ELOINA) 600 MG tablet, Take 1 tablet by mouth Daily., Disp: , Rfl:     Cyanocobalamin (Vitamin B-12) 5000 MCG tablet dispersible, Take 1 tablet by mouth Daily., Disp: , Rfl:     diphenhydrAMINE (BENADRYL) 25 mg capsule, Take 12.5 mg by mouth At Night As Needed for Itching., Disp: , Rfl:     dorzolamide-timolol (COSOPT) 2-0.5 % ophthalmic solution, Administer 1  drop to both eyes 2 (Two) Times a Day., Disp: , Rfl:     Ezetimibe (ZETIA PO), Take 10 mg by mouth daily., Disp: , Rfl:     famotidine (PEPCID) 20 MG tablet, Take 1 tablet by mouth Daily., Disp: , Rfl:     flecainide (TAMBOCOR) 50 MG tablet, Take 1 tablet by mouth 2 (Two) Times a Day., Disp: 180 tablet, Rfl: 3    fluticasone (FLONASE) 50 MCG/ACT nasal spray, Administer 2 sprays into the nostril(s) as directed by provider As Needed for Rhinitis or Allergies., Disp: , Rfl:     hydroCHLOROthiazide (HYDRODIURIL) 12.5 MG tablet, As Needed., Disp: , Rfl:     latanoprost (XALATAN) 0.005 % ophthalmic solution, Administer 1 drop to both eyes Daily., Disp: , Rfl:     lisinopril (PRINIVIL,ZESTRIL) 10 MG tablet, TAKE 1 TABLET TWICE A DAY, Disp: 180 tablet, Rfl: 3    loratadine (CLARITIN) 10 MG tablet, Take 1 tablet by mouth As Needed., Disp: , Rfl:     melatonin 5 MG tablet tablet, Take 1 tablet by mouth Every Night., Disp: , Rfl:     metoprolol succinate XL (TOPROL-XL) 25 MG 24 hr tablet, Take 1 tablet by mouth Daily., Disp: 90 tablet, Rfl: 1    Multiple Vitamins-Minerals (MULTIVITAMIN ADULT PO), Take 1 tablet by mouth Daily., Disp: , Rfl:     Multiple Vitamins-Minerals (PRESERVISION AREDS PO), Take 1 tablet by mouth 2 (Two) Times a Day., Disp: , Rfl:     Omega-3 Fatty Acids (FISH OIL) 1000 MG capsule capsule, Take 1 capsule by mouth Daily With Breakfast., Disp: , Rfl:     Polyethyl Glycol-Propyl Glycol (SYSTANE OP), Apply 1 drop to eye As Needed (dry eye)., Disp: , Rfl:     Xarelto 20 MG tablet, Take 1 tablet by mouth Daily With Dinner., Disp: 90 tablet, Rfl: 2    brimonidine (ALPHAGAN) 0.2 % ophthalmic solution, Administer 2 drops to both eyes Daily. (Patient not taking: Reported on 10/2/2024), Disp: , Rfl:     timolol (TIMOPTIC) 0.5 % ophthalmic solution, Administer 2 drops to both eyes Daily. (Patient not taking: Reported on 10/2/2024), Disp: , Rfl:     History of Present Illness     Pt presents for follow up of atrial  "arrhthymias, HTN, bradycardia. Since we last saw the pt, pt denies any AF episodes, SOB, CP, LH, and dizziness. Denies any hospitalizations, ER visits, bleeding, or TIA/CVA symptoms. Overall feels well. BP at home stable. Awaiting left knee replacement on the 10th.  When her blood pressure meds were adjusted in the past she has got hypotensive.          Vitals:    10/02/24 1425   BP: 168/72   BP Location: Left arm   Patient Position: Sitting   Pulse: 60   SpO2: 99%   Weight: 71.4 kg (157 lb 6.4 oz)   Height: 167.6 cm (66\")     Body mass index is 25.41 kg/m².  PE:  General: NAD  Neck: no JVD, no carotid bruits, no TM  Heart RRR, NL S1, S2, S4 present, no rubs, murmurs  Lungs: CTA, no wheezes, rhonchi, or rales  Abd: soft, non-tender, NL BS  Ext: No musculoskeletal deformities, no edema, cyanosis, or clubbing  Psych: normal mood and affect    Diagnostic Data:        ECG 12 Lead    Date/Time: 10/2/2024 2:28 PM  Performed by: Hector Mcclellan MD    Authorized by: Hector Mcclellan MD  Comparison: compared with previous ECG from 8/23/2023  Rhythm: sinus rhythm  BPM: 60          Advance Care Planning   ACP discussion was held with the patient during this visit. Patient has an advance directive in EMR which is still valid.        1. Atrial arrhythmia    2. Atrial tachycardia    3. Primary hypertension          Plan:    1) Atrial arrhythmias/AT/PACs/A. fib: Flecainide therapy, EKG today stable.  Symptoms well mitigated.  No recurrent breakthrough events.      2) Anticoagulation: on Xarelto 20 mg daily for Chads Vasc equal to 3.      3) HTN: monitor at home; per PCP    Okay for clearance for left knee replacement with Dr. Britt in [place].  Hold Xarelto 3 days prior to knee replacement start after knee replacement.    F/up in 6 months      "

## 2025-02-03 RX ORDER — RIVAROXABAN 20 MG/1
20 TABLET, FILM COATED ORAL
Qty: 90 TABLET | Refills: 3 | Status: SHIPPED | OUTPATIENT
Start: 2025-02-03

## 2025-03-25 ENCOUNTER — TELEPHONE (OUTPATIENT)
Dept: CARDIOLOGY | Facility: CLINIC | Age: 80
End: 2025-03-25
Payer: MEDICARE

## 2025-03-25 ENCOUNTER — TELEPHONE (OUTPATIENT)
Dept: CARDIOLOGY | Facility: CLINIC | Age: 80
End: 2025-03-25

## 2025-03-25 NOTE — TELEPHONE ENCOUNTER
Dr. Wilfred Honeycutt would like to know if the patient can hold Xarelto 2 days prior and 2 days after a tooth extraction with bone graft on Thursday?      (P)[phone]  (F)[phone]

## 2025-03-25 NOTE — TELEPHONE ENCOUNTER
Caller: Tanesha Lua    Relationship: Self    Best call back number: [phone]     What is the best time to reach you: ANY    Who are you requesting to speak with (clinical staff, provider,  specific staff member): ANY      What was the call regarding: PATIENT STATES SHE RECEIVED A CALL FROM THIS OFFICE THAT SHE BELIEVES IS IN REGARDS TO HER REQUEST ABOUT DENTAL WORK CLEARANCE. PLEASE CONTACT PATIENT BACK AT YOUR EARLIEST CONVENIENCE.    Is it okay if the provider responds through [facility]: PLEASE CALL